# Patient Record
Sex: FEMALE | Race: WHITE | Employment: OTHER | URBAN - METROPOLITAN AREA
[De-identification: names, ages, dates, MRNs, and addresses within clinical notes are randomized per-mention and may not be internally consistent; named-entity substitution may affect disease eponyms.]

---

## 2017-08-12 ENCOUNTER — APPOINTMENT (OUTPATIENT)
Dept: GENERAL RADIOLOGY | Facility: HOSPITAL | Age: 67
End: 2017-08-12
Attending: EMERGENCY MEDICINE
Payer: COMMERCIAL

## 2017-08-12 ENCOUNTER — HOSPITAL ENCOUNTER (EMERGENCY)
Facility: HOSPITAL | Age: 67
Discharge: HOME | End: 2017-08-12
Attending: EMERGENCY MEDICINE
Payer: COMMERCIAL

## 2017-08-12 ENCOUNTER — APPOINTMENT (OUTPATIENT)
Dept: CT IMAGING | Facility: HOSPITAL | Age: 67
End: 2017-08-12
Attending: EMERGENCY MEDICINE
Payer: COMMERCIAL

## 2017-08-12 VITALS
SYSTOLIC BLOOD PRESSURE: 132 MMHG | RESPIRATION RATE: 18 BRPM | HEIGHT: 66 IN | TEMPERATURE: 98 F | OXYGEN SATURATION: 99 % | BODY MASS INDEX: 27.32 KG/M2 | DIASTOLIC BLOOD PRESSURE: 79 MMHG | HEART RATE: 81 BPM | WEIGHT: 170 LBS

## 2017-08-12 DIAGNOSIS — S62.102A LEFT WRIST FRACTURE, CLOSED, INITIAL ENCOUNTER: Primary | ICD-10-CM

## 2017-08-12 DIAGNOSIS — R55 VASOVAGAL NEAR SYNCOPE: ICD-10-CM

## 2017-08-12 DIAGNOSIS — S93.402A SPRAIN OF LEFT ANKLE, UNSPECIFIED LIGAMENT, INITIAL ENCOUNTER: ICD-10-CM

## 2017-08-12 LAB
ANION GAP SERPL CALC-SCNC: 9 MMOL/L (ref 0–18)
BASOPHILS # BLD: 0.1 K/UL (ref 0–0.2)
BASOPHILS NFR BLD: 1 %
BUN SERPL-MCNC: 9 MG/DL (ref 8–20)
BUN/CREAT SERPL: 12.9 (ref 10–20)
CALCIUM SERPL-MCNC: 9.1 MG/DL (ref 8.5–10.5)
CHLORIDE SERPL-SCNC: 98 MMOL/L (ref 95–110)
CO2 SERPL-SCNC: 23 MMOL/L (ref 22–32)
CREAT SERPL-MCNC: 0.7 MG/DL (ref 0.5–1.5)
EOSINOPHIL # BLD: 0.1 K/UL (ref 0–0.7)
EOSINOPHIL NFR BLD: 1 %
ERYTHROCYTE [DISTWIDTH] IN BLOOD BY AUTOMATED COUNT: 13 % (ref 11–15)
GLUCOSE SERPL-MCNC: 118 MG/DL (ref 70–99)
HCT VFR BLD AUTO: 35.9 % (ref 35–48)
HGB BLD-MCNC: 12.3 G/DL (ref 12–16)
LYMPHOCYTES # BLD: 0.7 K/UL (ref 1–4)
LYMPHOCYTES NFR BLD: 11 %
MCH RBC QN AUTO: 32.1 PG (ref 27–32)
MCHC RBC AUTO-ENTMCNC: 34.2 G/DL (ref 32–37)
MCV RBC AUTO: 93.9 FL (ref 80–100)
MONOCYTES # BLD: 0.7 K/UL (ref 0–1)
MONOCYTES NFR BLD: 11 %
NEUTROPHILS # BLD AUTO: 5.1 K/UL (ref 1.8–7.7)
NEUTROPHILS NFR BLD: 76 %
OSMOLALITY UR CALC.SUM OF ELEC: 270 MOSM/KG (ref 275–295)
PLATELET # BLD AUTO: 215 K/UL (ref 140–400)
PMV BLD AUTO: 8.4 FL (ref 7.4–10.3)
POTASSIUM SERPL-SCNC: 4.7 MMOL/L (ref 3.3–5.1)
RBC # BLD AUTO: 3.82 M/UL (ref 3.7–5.4)
SODIUM SERPL-SCNC: 130 MMOL/L (ref 136–144)
WBC # BLD AUTO: 6.7 K/UL (ref 4–11)

## 2017-08-12 PROCEDURE — 99285 EMERGENCY DEPT VISIT HI MDM: CPT

## 2017-08-12 PROCEDURE — 80048 BASIC METABOLIC PNL TOTAL CA: CPT | Performed by: EMERGENCY MEDICINE

## 2017-08-12 PROCEDURE — 36415 COLL VENOUS BLD VENIPUNCTURE: CPT

## 2017-08-12 PROCEDURE — 71010 XR CHEST AP PORTABLE  (CPT=71010): CPT | Performed by: EMERGENCY MEDICINE

## 2017-08-12 PROCEDURE — 70450 CT HEAD/BRAIN W/O DYE: CPT | Performed by: EMERGENCY MEDICINE

## 2017-08-12 PROCEDURE — 93010 ELECTROCARDIOGRAM REPORT: CPT | Performed by: EMERGENCY MEDICINE

## 2017-08-12 PROCEDURE — 85025 COMPLETE CBC W/AUTO DIFF WBC: CPT | Performed by: EMERGENCY MEDICINE

## 2017-08-12 PROCEDURE — 73110 X-RAY EXAM OF WRIST: CPT | Performed by: EMERGENCY MEDICINE

## 2017-08-12 PROCEDURE — 93005 ELECTROCARDIOGRAM TRACING: CPT

## 2017-08-12 PROCEDURE — 73610 X-RAY EXAM OF ANKLE: CPT | Performed by: EMERGENCY MEDICINE

## 2017-08-12 RX ORDER — HYDROCODONE BITARTRATE AND ACETAMINOPHEN 5; 325 MG/1; MG/1
1-2 TABLET ORAL EVERY 4 HOURS PRN
Qty: 20 TABLET | Refills: 0 | Status: SHIPPED | OUTPATIENT
Start: 2017-08-12 | End: 2017-08-19

## 2017-08-12 RX ORDER — SODIUM CHLORIDE 9 MG/ML
1000 INJECTION, SOLUTION INTRAVENOUS ONCE
Status: DISCONTINUED | OUTPATIENT
Start: 2017-08-12 | End: 2017-08-12

## 2017-08-12 RX ORDER — HYDROCODONE BITARTRATE AND ACETAMINOPHEN 5; 325 MG/1; MG/1
2 TABLET ORAL ONCE
Status: COMPLETED | OUTPATIENT
Start: 2017-08-12 | End: 2017-08-12

## 2017-08-12 NOTE — ED PROVIDER NOTES
Patient Seen in: [facility] AND [facility] Emergency Department    History   Patient presents with:  Fall (musculoskeletal, neurologic)    Stated Complaint: fall    HPI    The patient is a 19-year-old female who missed the last 2 steps of her stairs this morning • Cancer Mother    • Diabetes Mother    • Heart Disease Mother        Smoking status: Never Smoker                                                              Smokeless tobacco: Never Used                      Alcohol use: Yes           0.5 oz/week tenderness found. No head of 5th metatarsal and no proximal fibula tenderness found. Neurological: She is alert and oriented to person, place, and time. She has normal strength. No cranial nerve deficit or sensory deficit.  Coordination normal.   Skin: Sk returned and re-examined the patient. The splint was adequately immobilizing the joint and distal to the splint the patient's circulation and sensation was intact.   Ankle Aircast placed to left ankle   MDM     Pulse Ox: 99%, Normal, room air    Cardiac Mo There is also large vessel atherosclerosis. 3. Chronic maxillary sinus disease. 4. Lesser incidental findings as above. Xr Chest Ap Portable  (cpt=71010)    Result Date: 8/12/2017  CONCLUSION: No acute disease.             Radiology exams  Viewed a

## 2017-08-12 NOTE — ED INITIAL ASSESSMENT (HPI)
Pt c/o  left elbow, left wrist, left ankle and head pain s/p trip and  fall today at 0400. No loc. Pt states she missed a step. Pt states she also \"blacked out\" today at 1030 while standing in her kitchen.   Pt states she was out for \"just a few seconds

## 2017-08-16 PROBLEM — S62.015A CLOSED NONDISPLACED FRACTURE OF DISTAL POLE OF SCAPHOID BONE OF LEFT WRIST, INITIAL ENCOUNTER: Status: ACTIVE | Noted: 2017-08-16

## 2018-04-15 ENCOUNTER — APPOINTMENT (OUTPATIENT)
Dept: GENERAL RADIOLOGY | Age: 68
End: 2018-04-15
Attending: EMERGENCY MEDICINE
Payer: COMMERCIAL

## 2018-04-15 ENCOUNTER — HOSPITAL ENCOUNTER (OUTPATIENT)
Age: 68
Discharge: ANOTHER HOSPITAL | End: 2018-04-15
Attending: EMERGENCY MEDICINE
Payer: COMMERCIAL

## 2018-04-15 VITALS
BODY MASS INDEX: 27 KG/M2 | TEMPERATURE: 99 F | OXYGEN SATURATION: 97 % | DIASTOLIC BLOOD PRESSURE: 83 MMHG | HEART RATE: 74 BPM | RESPIRATION RATE: 16 BRPM | WEIGHT: 165 LBS | SYSTOLIC BLOOD PRESSURE: 130 MMHG

## 2018-04-15 DIAGNOSIS — S01.81XA FOREHEAD LACERATION, INITIAL ENCOUNTER: Primary | ICD-10-CM

## 2018-04-15 DIAGNOSIS — S00.81XA FACIAL ABRASION, INITIAL ENCOUNTER: ICD-10-CM
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
## 2018-04-15 DIAGNOSIS — Z79.01 CHRONIC ANTICOAGULATION: ICD-10-CM

## 2018-04-15 DIAGNOSIS — S00.93XA CONTUSION OF HEAD, UNSPECIFIED PART OF HEAD, INITIAL ENCOUNTER: ICD-10-CM

## 2018-04-15 PROCEDURE — 99213 OFFICE O/P EST LOW 20 MIN: CPT

## 2018-04-15 PROCEDURE — 70160 X-RAY EXAM OF NASAL BONES: CPT | Performed by: EMERGENCY MEDICINE

## 2018-04-15 PROCEDURE — 70150 X-RAY EXAM OF FACIAL BONES: CPT | Performed by: EMERGENCY MEDICINE

## 2018-04-15 PROCEDURE — 12013 RPR F/E/E/N/L/M 2.6-5.0 CM: CPT

## 2018-04-15 NOTE — ED PROVIDER NOTES
Patient Seen in: [facility] Immediate Care In [address] [place] [address]    History   Patient presents with:  Fall (musculoskeletal, neurologic)    Stated Complaint: Fall/Head/Nose Injury    HPI  Patient on her way walking into work when she stumbled and fell forwa Current:/83   Pulse 74   Temp 98.5 °F (36.9 °C) (Oral)   Resp 16   Wt 74.8 kg   SpO2 97%   BMI 26.63 kg/m²         Physical Exam   Constitutional: She is oriented to person, place, and time. She appears well-developed and well-nourished. No distress. I discussed with the patient my concern for the viability of the corners of this wound due to the significant trauma to the soft tissue. All questions were answered prior to repair. Verbal consent was obtained from the patient.   The wound was irrigated co Disposition: Ic to ed  4/15/2018  4:54 pm    Follow-up:  No follow-up provider specified.       Medications Prescribed:  Discharge Medication List as of 4/15/2018  4:57 PM

## 2018-04-15 NOTE — ED INITIAL ASSESSMENT (HPI)
Tripped and fell while walking into work today. Landed on her face. Denies LOC. +swelling to upper lip. +laceration to forehead. Pt denies feeling dizzy, lightheaded, no vision change. Takes a daily aspirin (325mg). Pt refused ambulance.

## 2018-04-15 NOTE — ED NOTES
Pt advised to go directly to the ED for a head CT. Pt states that she will go in the morning, but if she experiences any dizziness, vision changes, nausea, vomiting,etc. She will go right away.

## 2018-04-16 ENCOUNTER — APPOINTMENT (OUTPATIENT)
Dept: CT IMAGING | Facility: HOSPITAL | Age: 68
End: 2018-04-16
Payer: COMMERCIAL

## 2018-04-16 ENCOUNTER — HOSPITAL ENCOUNTER (EMERGENCY)
Facility: HOSPITAL | Age: 68
Discharge: HOME | End: 2018-04-16
Payer: COMMERCIAL

## 2018-04-16 VITALS
TEMPERATURE: 98 F | DIASTOLIC BLOOD PRESSURE: 62 MMHG | WEIGHT: 165 LBS | HEART RATE: 76 BPM | OXYGEN SATURATION: 95 % | BODY MASS INDEX: 26.52 KG/M2 | HEIGHT: 66 IN | SYSTOLIC BLOOD PRESSURE: 132 MMHG | RESPIRATION RATE: 18 BRPM

## 2018-04-16 DIAGNOSIS — S01.81XD FOREHEAD LACERATION, SUBSEQUENT ENCOUNTER: ICD-10-CM

## 2018-04-16 DIAGNOSIS — S00.33XD CONTUSION OF NOSE, SUBSEQUENT ENCOUNTER: ICD-10-CM

## 2018-04-16 DIAGNOSIS — S09.90XA MINOR HEAD INJURY, INITIAL ENCOUNTER: Primary | ICD-10-CM

## 2018-04-16 PROCEDURE — 70450 CT HEAD/BRAIN W/O DYE: CPT

## 2018-04-16 PROCEDURE — 99284 EMERGENCY DEPT VISIT MOD MDM: CPT

## 2018-04-16 NOTE — ED PROVIDER NOTES
Patient Seen in: [facility] AND [facility] Emergency Department    History   Patient presents with:  Fall (musculoskeletal, neurologic)    Stated Complaint: fell yesterday    HPI     63-year-old female presents to the emergency department after a trip and fall t Current:/60   Pulse 75   Temp 98 °F (36.7 °C)   Resp 18   Ht 167.6 cm (5' 6\")   Wt 74.8 kg   SpO2 94%   BMI 26.63 kg/m²         Physical Exam   Constitutional: She is oriented to person, place, and time.  She appears well-developed and well-nourished

## 2018-04-16 NOTE — ED NOTES
Showed up at the ct department wanting a CT brain to be done today never went to the ed as directed yesterday. Per CT tech.

## 2018-04-16 NOTE — ED INITIAL ASSESSMENT (HPI)
c/o fall yesterday and injured face/head, takes aspirin daily, pmd sent her for ct scan, denies symptoms, denies loc/nausea

## 2018-04-16 NOTE — ED NOTES
Patient is cleared for discharge per Emergency Department provider. Discharge instructions reviewed with patient including when and how to follow up with healthcare providers and when to seek emergency care.   Patient dressed self and ambulated to exit wit

## 2018-09-24 ENCOUNTER — APPOINTMENT (OUTPATIENT)
Dept: CT IMAGING | Facility: HOSPITAL | Age: 68
DRG: 683 | End: 2018-09-24
Attending: EMERGENCY MEDICINE
Payer: MEDICARE

## 2018-09-24 ENCOUNTER — APPOINTMENT (OUTPATIENT)
Dept: GENERAL RADIOLOGY | Facility: HOSPITAL | Age: 68
DRG: 683 | End: 2018-09-24
Attending: EMERGENCY MEDICINE
Payer: MEDICARE

## 2018-09-24 ENCOUNTER — APPOINTMENT (OUTPATIENT)
Dept: NUCLEAR MEDICINE | Facility: HOSPITAL | Age: 68
DRG: 683 | End: 2018-09-24
Attending: EMERGENCY MEDICINE
Payer: MEDICARE

## 2018-09-24 ENCOUNTER — HOSPITAL ENCOUNTER (INPATIENT)
Facility: HOSPITAL | Age: 68
LOS: 4 days | Discharge: HOME HEALTH | DRG: 683 | End: 2018-09-28
Attending: EMERGENCY MEDICINE | Admitting: HOSPITALIST
Payer: MEDICARE

## 2018-09-24 DIAGNOSIS — N39.0 URINARY TRACT INFECTION WITHOUT HEMATURIA, SITE UNSPECIFIED: Primary | ICD-10-CM

## 2018-09-24 DIAGNOSIS — N17.9 AKI (ACUTE KIDNEY INJURY) (HCC): ICD-10-CM

## 2018-09-24 PROCEDURE — 78582 LUNG VENTILAT&PERFUS IMAGING: CPT | Performed by: EMERGENCY MEDICINE

## 2018-09-24 PROCEDURE — 71045 X-RAY EXAM CHEST 1 VIEW: CPT | Performed by: EMERGENCY MEDICINE

## 2018-09-24 PROCEDURE — 70450 CT HEAD/BRAIN W/O DYE: CPT | Performed by: EMERGENCY MEDICINE

## 2018-09-24 PROCEDURE — 99223 1ST HOSP IP/OBS HIGH 75: CPT | Performed by: HOSPITALIST

## 2018-09-24 PROCEDURE — CB12VZZ PLANAR NUCLEAR MEDICINE IMAGING OF LUNGS AND BRONCHI USING XENON 133 (XE-133): ICD-10-PCS | Performed by: NUCLEAR MEDICINE

## 2018-09-24 RX ORDER — POTASSIUM CHLORIDE 20 MEQ/1
40 TABLET, EXTENDED RELEASE ORAL EVERY 4 HOURS
Status: COMPLETED | OUTPATIENT
Start: 2018-09-24 | End: 2018-09-24

## 2018-09-24 RX ORDER — LORAZEPAM 1 MG/1
1 TABLET ORAL
Status: DISCONTINUED | OUTPATIENT
Start: 2018-09-24 | End: 2018-09-27

## 2018-09-24 RX ORDER — NITROFURANTOIN 25; 75 MG/1; MG/1
100 CAPSULE ORAL 2 TIMES DAILY
Qty: 10 CAPSULE | Refills: 0 | Status: SHIPPED | OUTPATIENT
Start: 2018-09-24 | End: 2018-09-28

## 2018-09-24 RX ORDER — MELATONIN
100 DAILY
Status: DISCONTINUED | OUTPATIENT
Start: 2018-09-25 | End: 2018-09-28

## 2018-09-24 RX ORDER — LORAZEPAM 2 MG/ML
1 INJECTION INTRAMUSCULAR
Status: DISCONTINUED | OUTPATIENT
Start: 2018-09-24 | End: 2018-09-27

## 2018-09-24 RX ORDER — SODIUM CHLORIDE 0.9 % (FLUSH) 0.9 %
3 SYRINGE (ML) INJECTION AS NEEDED
Status: DISCONTINUED | OUTPATIENT
Start: 2018-09-24 | End: 2018-09-28

## 2018-09-24 RX ORDER — PANTOPRAZOLE SODIUM 40 MG/1
40 TABLET, DELAYED RELEASE ORAL
Status: DISCONTINUED | OUTPATIENT
Start: 2018-09-25 | End: 2018-09-28

## 2018-09-24 RX ORDER — FOLIC ACID 1 MG/1
1 TABLET ORAL DAILY
Status: DISCONTINUED | OUTPATIENT
Start: 2018-09-24 | End: 2018-09-28

## 2018-09-24 RX ORDER — LORAZEPAM 2 MG/ML
2 INJECTION INTRAMUSCULAR
Status: DISCONTINUED | OUTPATIENT
Start: 2018-09-24 | End: 2018-09-27

## 2018-09-24 RX ORDER — LEVOTHYROXINE SODIUM 0.03 MG/1
25 TABLET ORAL DAILY
Status: DISCONTINUED | OUTPATIENT
Start: 2018-09-24 | End: 2018-09-28

## 2018-09-24 RX ORDER — ATORVASTATIN CALCIUM 20 MG/1
20 TABLET, FILM COATED ORAL DAILY
Status: DISCONTINUED | OUTPATIENT
Start: 2018-09-24 | End: 2018-09-24

## 2018-09-24 RX ORDER — ONDANSETRON 2 MG/ML
4 INJECTION INTRAMUSCULAR; INTRAVENOUS EVERY 6 HOURS PRN
Status: DISCONTINUED | OUTPATIENT
Start: 2018-09-24 | End: 2018-09-28

## 2018-09-24 RX ORDER — SODIUM CHLORIDE 9 MG/ML
1000 INJECTION, SOLUTION INTRAVENOUS ONCE
Status: COMPLETED | OUTPATIENT
Start: 2018-09-25 | End: 2018-09-25

## 2018-09-24 RX ORDER — LISINOPRIL 20 MG/1
20 TABLET ORAL DAILY
Status: DISCONTINUED | OUTPATIENT
Start: 2018-09-25 | End: 2018-09-25

## 2018-09-24 RX ORDER — SODIUM CHLORIDE 9 MG/ML
INJECTION, SOLUTION INTRAVENOUS
Status: COMPLETED
Start: 2018-09-24 | End: 2018-09-24

## 2018-09-24 RX ORDER — LORAZEPAM 1 MG/1
2 TABLET ORAL
Status: DISCONTINUED | OUTPATIENT
Start: 2018-09-24 | End: 2018-09-27

## 2018-09-24 RX ORDER — MULTIPLE VITAMINS W/ MINERALS TAB 9MG-400MCG
1 TAB ORAL DAILY
Status: DISCONTINUED | OUTPATIENT
Start: 2018-09-24 | End: 2018-09-28

## 2018-09-24 RX ORDER — SODIUM CHLORIDE 9 MG/ML
INJECTION, SOLUTION INTRAVENOUS CONTINUOUS
Status: DISCONTINUED | OUTPATIENT
Start: 2018-09-24 | End: 2018-09-26

## 2018-09-24 NOTE — ED PROVIDER NOTES
[facility] Emergency Department     History   Patient presents with:  Syncope (cardiovascular, neurologic)    Stated Complaint: SYNCOPE/HYPOTENSIVE    HPI    79year old female alcoholic who was brought to the ER after patient had a 1 glass occasionally    Drug use: No      Review of Systems    Positive for stated complaint: SYNCOPE/HYPOTENSIVE  Other systems are as noted in HPI. Constitutional and vital signs reviewed.       All other systems reviewed and negative except as noted abo Skin: Skin is intact. No petechiae noted. She is not diaphoretic. No cyanosis. No pallor. Psychiatric: She has a normal mood and affect. Her behavior is normal.   Nursing note and vitals reviewed.                 ED Course   Nursing notes and Triage vit CREATININE)   REDRAW HFPA/K (P)   MAGNESIUM   VITAMIN B12 WITH REFLEX TO MMA   RAINBOW DRAW BLUE   RAINBOW DRAW LAVENDER   RAINBOW DRAW DARK GREEN   RAINBOW DRAW LIGHT GREEN   RAINBOW DRAW GOLD   RAINBOW DRAW LAVENDER TALL (BNP)   BLOOD CULTURE   BLOOD CUL reports and images reviewed personally and are negative for emergent cause of patient symptoms. I discussed pertinent results, any required  acute management issues, and/or the need for follow-up, with the patient/gaurdian.  See radiology reports for detail List    START taking these medications    Nitrofurantoin Monohyd Macro 100 MG Oral Cap  Take 1 capsule (100 mg total) by mouth 2 (two) times daily for 5 days.   Qty: 10 capsule Refills: 0          Present on Admission  Date Reviewed: 8/23/2017          ICD-

## 2018-09-24 NOTE — H&P
[facility]    PATIENT'S NAME: Sylvia Mariella   ATTENDING PHYSICIAN: Roslyn Roth MD   PATIENT ACCOUNT#:   [id number]    LOCATION:  [place] [address]  MEDICAL RECORD #:   [id number]       YOB: 1950  ADMISSION DATE:       09/24/201 She mainly drinks wine per the ; not clear the exact amount of a daily basis. REVIEW OF SYSTEMS:  According to the , the patient is a very heavy drinker. The patient will not disclose the exact amount of alcohol she drinks.   She has been recommendations to follow.     Dictated By Bolivar Cantrell MD  d: 09/24/2018 13:48:19  t: 09/24/2018 14:19:27  Job [id number]  /

## 2018-09-24 NOTE — ED INITIAL ASSESSMENT (HPI)
FOUND BY FAMILY ON THE TOILET, SYNCOPE, CANNOT REMEMBER EVENTS PRIOR, NO RECENT SICKNESS, (POSSIBLE SICK OVER THE PAST COUPLE DAYS PER DAUGHTER)

## 2018-09-24 NOTE — PLAN OF CARE
Pt daughter tearful, pt daughter states \" I gave her THC and CBD yesterday, so she would eat\"  Daughter reports she did eat a bit more than normal

## 2018-09-25 PROCEDURE — HZ2ZZZZ DETOXIFICATION SERVICES FOR SUBSTANCE ABUSE TREATMENT: ICD-10-PCS | Performed by: HOSPITALIST

## 2018-09-25 PROCEDURE — 99233 SBSQ HOSP IP/OBS HIGH 50: CPT | Performed by: HOSPITALIST

## 2018-09-25 RX ORDER — SODIUM CHLORIDE 9 MG/ML
INJECTION, SOLUTION INTRAVENOUS
Status: COMPLETED
Start: 2018-09-25 | End: 2018-09-26

## 2018-09-25 RX ORDER — HEPARIN SODIUM 5000 [USP'U]/ML
5000 INJECTION, SOLUTION INTRAVENOUS; SUBCUTANEOUS EVERY 12 HOURS
Status: DISCONTINUED | OUTPATIENT
Start: 2018-09-25 | End: 2018-09-28

## 2018-09-25 RX ORDER — 0.9 % SODIUM CHLORIDE 0.9 %
VIAL (ML) INJECTION
Status: COMPLETED
Start: 2018-09-25 | End: 2018-09-25

## 2018-09-25 NOTE — DIETARY NOTE
ADULT NUTRITION INITIAL ASSESSMENT    Pt is at moderate nutrition risk. Pt meets malnutrition criteria.       RECOMMENDATIONS TO MD:  See Nutrition Intervention     CRITERIA FOR MALNUTRITION DIAGNOSIS: Criteria for non-severe malnutrition diagnosis: acute hematuria, site unspecified [N39.0]   PERTINENT PAST MEDICAL HISTORY:  has a past medical history of Cancer ([facility] [address] (2014), Heart disease, High blood pressure, High cholesterol, and Thyroid disease.     ANTHROPOMETRICS:  HT: 170.2 cm (5' 7\")       WT: 68.1 k intake. - Anthropometric Measurement:   Monitor: wt and wt change  - Nutrition Goals: halt wt loss, maintain wt within 5%, PO and supplement greater than 75% of needs, labs WNL, promote healing, support wound healing and ABSTINENCE from alcohol.      Cheyenne Castleman

## 2018-09-25 NOTE — OCCUPATIONAL THERAPY NOTE
OCCUPATIONAL THERAPY EVALUATION - INPATIENT     Room Number: 540/540-A  Evaluation Date: 9/25/2018  Type of Evaluation: Initial       Physician Order: IP Consult to Occupational Therapy  Reason for Therapy: ADL/IADL Dysfunction and Discharge Planning    OC Problems:    Urinary tract infection without hematuria    CHAD (acute kidney injury) [facility])      Past Medical History  Past Medical History:  2014: Cancer ([facility] [address]      Comment:  BCC left parietal scalp  No date: Heart disease  No date: High blood pressure  No limits    COORDINATION  Gross Motor: Poor balance    Fine Motor: WFL    ACTIVITIES OF DAILY LIVING ASSESSMENT  AM-PAC ‘6-Clicks’ Inpatient Daily Activity Short Form  How much help from another person does the patient currently need…  -   Putting on and tremayne week    Millicent Elizalde OTR/L  [facility] AND [facility]

## 2018-09-25 NOTE — WOUND PROGRESS NOTE
WOUND CARE NOTE      PLAN   Recommendations:  Dietary consult for recommendations for nutrition to optimize wound healing  Turn schedules  Heels elevated using pillows, heel wedge or heel boots to offload heels  To prevent sliding: decrease head of bed a

## 2018-09-25 NOTE — PROGRESS NOTES
[facility]    Progress Note    Maximo Ford Patient Status:  Inpatient    1950 MRN [id number]   Location [facility] 5SW/SE Attending Lloyd Jimenez MD   Hosp Day # 1 PCP Hilario Beavers MD       Subjective:     Pt sleeping. MD on 9/24/2018 at 14:04          Nm Lung Vq Vent / Perfusion Scan  (OLJ=[id number])    Result Date: 9/24/2018  CONCLUSION: Ventilation/perfusion lung scan demonstrates no evidence for acute pulmonary embolism.   The accumulation of xenon 133 in the liver is con

## 2018-09-25 NOTE — PHYSICAL THERAPY NOTE
PHYSICAL THERAPY EVALUATION - INPATIENT     Room Number: 540/540-A  Evaluation Date: 9/25/2018  Type of Evaluation: Initial   Physician Order: PT Eval and Treat    Presenting Problem: UTI  Reason for Therapy: Mobility Dysfunction and Discharge Planning PHYSICAL THERAPY MEDICAL/SOCIAL HISTORY     History related to current admission:       Problem List  Principal Problem:    Urinary tract infection without hematuria, site unspecified  Active Problems:    Urinary tract infection without hematuria    AK patient currently have. ..  -   Turning over in bed (including adjusting bedclothes, sheets and blankets)?: A Little   -   Sitting down on and standing up from a chair with arms (e.g., wheelchair, bedside commode, etc.): A Lot   -   Moving from lying on vanessa discharge.    Goal #5   Current Status    Goal #6    Goal #6  Current Status

## 2018-09-26 PROCEDURE — 99233 SBSQ HOSP IP/OBS HIGH 50: CPT | Performed by: HOSPITALIST

## 2018-09-26 NOTE — OCCUPATIONAL THERAPY NOTE
OCCUPATIONAL THERAPY TREATMENT NOTE - INPATIENT        Room Number: 540/540-A                Problem List  Principal Problem:    Urinary tract infection without hematuria, site unspecified  Active Problems:    Urinary tract infection without hematuria    A taking off regular upper body clothing?: None  -   Taking care of personal grooming such as brushing teeth?: None(with set up and pt seated)  -   Eating meals?: None    AM-PAC Score:  Score: 21  Approx Degree of Impairment: 32.79%  Standardized Score (AM-P

## 2018-09-26 NOTE — CM/SW NOTE
SW met w/ pt to discuss d/c planning. Pt lives at home w/ . Pt reported using a walker on occasion. Pt reported no services at this time. SW discussed PT recommendation of SNF - pt declining at this time.  SW discussed HHC - pt agreeable and has no H

## 2018-09-26 NOTE — PLAN OF CARE
Problem: Patient Centered Care  Goal: Patient preferences are identified and integrated in the patient's plan of care  Interventions:  - What would you like us to know as we care for you?   - Provide timely, complete, and accurate information to patient/fa resources and transportation as appropriate  - Identify discharge learning needs (meds, wound care, etc)  - Arrange for interpreters to assist at discharge as needed  - Consider post-discharge preferences of patient/family/discharge partner  - Complete EUGENIA

## 2018-09-26 NOTE — PROGRESS NOTES
[facility]    Progress Note    Maximo Ford Patient Status:  Inpatient    1950 MRN [id number]   Location [facility] [place] 5SW/SE Attending Deni Eng, [address] [facility] Day # 2 PCP Hilario Beavers MD       Subjective:   Maximo Ford Sertraline HCl (ZOLOFT) tab 50 mg 50 mg Oral Daily       Lab Results   Component Value Date    WBC 5.0 09/26/2018    HGB 8.7 (L) 09/26/2018    HCT 26.1 (L) 09/26/2018     09/26/2018    CREATSERUM 1.01 09/26/2018    BUN 10 09/26/2018     09/2 HGB 8.6 (L) 09/25/2018    HGB 10.7 (L) 09/24/2018    Lab Results   Component Value Date     09/26/2018     09/25/2018     09/24/2018       Recent Labs   Lab  09/24/18   1244   09/25/18   0017  09/25/18   0623  09/26/18   0558   GLU  14 of illness, I anticipate that, after discharge, patient will require TBD.

## 2018-09-26 NOTE — RESPIRATORY THERAPY NOTE
FABY ASSESSMENT:    Pt does not have a previous diagnosis of FABY. Pt does not routinely use a CPAP device at home. This pt is suspected to be at high risk for FABY and sleep lab packet was provided to patient for outpatient follow-up.     RECOMENDATIONS:  Con

## 2018-09-27 PROCEDURE — 99233 SBSQ HOSP IP/OBS HIGH 50: CPT | Performed by: HOSPITALIST

## 2018-09-27 RX ORDER — POTASSIUM CHLORIDE 20 MEQ/1
40 TABLET, EXTENDED RELEASE ORAL ONCE
Status: COMPLETED | OUTPATIENT
Start: 2018-09-27 | End: 2018-09-27

## 2018-09-27 RX ORDER — MAGNESIUM OXIDE 400 MG (241.3 MG MAGNESIUM) TABLET
400 TABLET ONCE
Status: COMPLETED | OUTPATIENT
Start: 2018-09-27 | End: 2018-09-27

## 2018-09-27 RX ORDER — LORAZEPAM 0.5 MG/1
0.5 TABLET ORAL EVERY 4 HOURS PRN
Status: DISCONTINUED | OUTPATIENT
Start: 2018-09-27 | End: 2018-09-28

## 2018-09-27 NOTE — HOME CARE LIAISON
Received referral from [address] [place] [address]. [facility]idential unable to accept patient for home health services at this time. Recommended home health agency with psych services, for patient's safety. PITA Willoughby notified.

## 2018-09-27 NOTE — PHYSICAL THERAPY NOTE
PHYSICAL THERAPY TREATMENT NOTE - INPATIENT     Room Number: 540/540-A       Presenting Problem: UTI    Problem List  Principal Problem:    Urinary tract infection without hematuria, site unspecified  Active Problems:    Urinary tract infection without hem Score:  Raw Score: 14   PT Approx Degree of Impairment Score: 61.29%   Standardized Score (AM-PAC Scale): 38.1   CMS Modifier (G-Code): CL    FUNCTIONAL ABILITY STATUS  Gait Assessment   Gait Assistance: Minimum assistance  Distance (ft): 5ft x 1  Assistiv

## 2018-09-27 NOTE — PROGRESS NOTES
[facility]    Progress Note    Caitlin Trotter Patient Status:  Inpatient    1950 MRN [id number]   Location [facility] 5SW/SE Attending Jam Ramirez, [address] [facility] Day # 3 PCP Jennifer Vences MD       Subjective:   aCitlin Trotter tab 25 mcg 25 mcg Oral Daily   metoprolol Tartrate (LOPRESSOR) tab 25 mg 25 mg Oral BID   Sertraline HCl (ZOLOFT) tab 50 mg 50 mg Oral Daily       Lab Results   Component Value Date    WBC 4.5 09/27/2018    HGB 8.1 (L) 09/27/2018    HCT 24.2 (L) 09/27/2018 metabolic acidosis with elevated lactic acid. Improved with IVF. Lactic acid normalized. - off ivfs      CHAD. Due to above. Improving.  - off ivfs   - hold lisinopril     Abnormal liver function tests most likely related to alcoholic liver disease.   -

## 2018-09-27 NOTE — CM/SW NOTE
[facility] unable to accept    Referrals placed to [facility], [facility], and [facility] due to these agencies providing psych RN. HHC order sent.     Nellie Keys, [address]

## 2018-09-28 VITALS
TEMPERATURE: 98 F | HEIGHT: 67 IN | SYSTOLIC BLOOD PRESSURE: 115 MMHG | DIASTOLIC BLOOD PRESSURE: 57 MMHG | BODY MASS INDEX: 23.56 KG/M2 | OXYGEN SATURATION: 100 % | HEART RATE: 76 BPM | RESPIRATION RATE: 20 BRPM | WEIGHT: 150.13 LBS

## 2018-09-28 PROCEDURE — 99239 HOSP IP/OBS DSCHRG MGMT >30: CPT | Performed by: HOSPITALIST

## 2018-09-28 RX ORDER — SODIUM CHLORIDE 9 MG/ML
INJECTION, SOLUTION INTRAVENOUS
Status: COMPLETED
Start: 2018-09-28 | End: 2018-09-28

## 2018-09-28 RX ORDER — ACETAMINOPHEN 325 MG/1
650 TABLET ORAL EVERY 6 HOURS PRN
Status: DISCONTINUED | OUTPATIENT
Start: 2018-09-28 | End: 2018-09-28

## 2018-09-28 RX ORDER — LORAZEPAM 0.5 MG/1
0.5 TABLET ORAL EVERY 4 HOURS PRN
Qty: 5 TABLET | Refills: 0 | Status: ON HOLD | OUTPATIENT
Start: 2018-09-28 | End: 2021-08-19

## 2018-09-28 RX ORDER — MAGNESIUM OXIDE 400 MG (241.3 MG MAGNESIUM) TABLET
800 TABLET ONCE
Status: COMPLETED | OUTPATIENT
Start: 2018-09-28 | End: 2018-09-28

## 2018-09-28 RX ORDER — MELATONIN
100 DAILY
Qty: 30 TABLET | Refills: 0 | Status: SHIPPED | OUTPATIENT
Start: 2018-09-29

## 2018-09-28 RX ORDER — 0.9 % SODIUM CHLORIDE 0.9 %
VIAL (ML) INJECTION
Status: COMPLETED
Start: 2018-09-28 | End: 2018-09-28

## 2018-09-28 NOTE — OCCUPATIONAL THERAPY NOTE
3OCCUPATIONAL THERAPY TREATMENT NOTE - INPATIENT        Room Number: 540/540-A                Problem List  Principal Problem:    Urinary tract infection without hematuria, site unspecified  Active Problems:    Urinary tract infection without hematuria Taking care of personal grooming such as brushing teeth?: None  -   Eating meals?: None    AM-PAC Score:  Score: 21  Approx Degree of Impairment: 32.79%  Standardized Score (AM-PAC Scale): 44.27  CMS Modifier (G-Code): CJ    FUNCTIONAL TRANSFER ASSESSMENT

## 2018-09-28 NOTE — CM/SW NOTE
9/28CM-The Patient's RN informed this Writer that the Patient is medically stable for discharge. This Writer informed the Patient's  of the above.  The Patient's  stated that he toured [email or web address], it was very depressing and would like the [facility]

## 2018-09-28 NOTE — CM/SW NOTE
This CM was contacted by  RN at the request of daughter who would like  written letter daughter will be taking care of mother at home for her employer.  Explained to patients daughter,  She would need to contact patients PCP to ask if the PCP would

## 2018-09-28 NOTE — DISCHARGE SUMMARY
[facility]    Discharge Summary    Junior Hernandez Patient Status:  Inpatient    1950 MRN [id number]   Location [facility] 5SW/SE Attending Jeremy Hammond MD   Hosp Day # 4 PCP Yasmine Hutton MD     Date of Admission:  sitting on the toilet, brought into the emergency department for evaluation, was not postictal.  No reported convulsions. Lactic acid was 4.1. No recorded fever. Potassium is being redrawn. Sodium 130, chloride 93, BUN and creatinine of 26 and 1.48.   G (100 mg total) by mouth daily. Quantity:  30 tablet  Refills:  0        CONTINUE taking these medications      Instructions Prescription details   aspirin 325 MG Tabs      Take 325 mg by mouth daily.    Refills:  0     atorvastatin 20 MG Tabs  Commonly kn

## 2018-09-28 NOTE — CM/SW NOTE
Case Management/ Progression of Care     CM consulted to assist with  transportation home. Patients daughter and daughter's boyfriend present in the patients room.    Daughter states she and her family have no funds to pay for a Medic[employer] ride home, [facility]

## 2018-09-28 NOTE — PROGRESS NOTES
This RN was called to try an IV on the patient. When RN entered the room patient's daughter was taking pictures of patients arms, which had bruising. This RN did explain patient is on a blood thinner, Heparin, and is a side effect of the drug.  When RN atte

## 2020-07-08 ENCOUNTER — APPOINTMENT (OUTPATIENT)
Dept: GENERAL RADIOLOGY | Age: 70
End: 2020-07-08
Attending: EMERGENCY MEDICINE
Payer: MEDICARE

## 2020-07-08 ENCOUNTER — HOSPITAL ENCOUNTER (OUTPATIENT)
Age: 70
Discharge: HOME | End: 2020-07-08
Attending: EMERGENCY MEDICINE
Payer: MEDICARE

## 2020-07-08 VITALS
OXYGEN SATURATION: 96 % | SYSTOLIC BLOOD PRESSURE: 120 MMHG | HEIGHT: 66 IN | DIASTOLIC BLOOD PRESSURE: 64 MMHG | HEART RATE: 72 BPM | RESPIRATION RATE: 16 BRPM | TEMPERATURE: 98 F | WEIGHT: 160 LBS | BODY MASS INDEX: 25.71 KG/M2

## 2020-07-08 DIAGNOSIS — S62.102A CLOSED FRACTURE OF LEFT WRIST, INITIAL ENCOUNTER: ICD-10-CM
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
## 2020-07-08 DIAGNOSIS — M25.539 WRIST PAIN, ACUTE: Primary | ICD-10-CM

## 2020-07-08 PROCEDURE — 73110 X-RAY EXAM OF WRIST: CPT | Performed by: EMERGENCY MEDICINE

## 2020-07-08 PROCEDURE — A4565 SLINGS: HCPCS | Performed by: EMERGENCY MEDICINE

## 2020-07-08 PROCEDURE — 99213 OFFICE O/P EST LOW 20 MIN: CPT | Performed by: EMERGENCY MEDICINE

## 2020-07-08 PROCEDURE — 29075 APPL CST ELBW FNGR SHORT ARM: CPT | Performed by: EMERGENCY MEDICINE

## 2020-07-08 NOTE — ED INITIAL ASSESSMENT (HPI)
MOTRIN GIVEN FOR FEVER Pt states she tripped over the ottoman and fell injuring her left wrist at 2 am today.

## 2020-07-08 NOTE — ED PROVIDER NOTES
Patient Seen in: [facility] AND CLINICS Immediate Care In [address] [place] [address]      History   Patient presents with:  Contusion  Upper Extremity Injury    Stated Complaint: Lt wrist pain; tripped, swollen    HPI  2 AM this morning patient was walking in the dark and trip O2 Device None (Room air)       Current:/64   Pulse 72   Temp 98.4 °F (36.9 °C) (Oral)   Resp 16   Ht 167.6 cm (5' 6\")   Wt 72.6 kg   SpO2 96%   BMI 25.82 kg/m²         Physical Exam  Vitals signs and nursing note reviewed.    Constitutional: discussed above. Long discussion with patient regarding falls in patients over the age of 72 and on aspirin being at increased risk for intracranial bleeding.   Potential outcomes including permanent vegetative state, permanent disability,

## 2020-07-13 ENCOUNTER — OFFICE VISIT (OUTPATIENT)
Dept: ORTHOPEDICS CLINIC | Facility: CLINIC | Age: 70
End: 2020-07-13
Payer: MEDICARE

## 2020-07-13 VITALS — HEIGHT: 66 IN | WEIGHT: 160 LBS | BODY MASS INDEX: 25.71 KG/M2

## 2020-07-13 DIAGNOSIS — S62.102A WRIST FRACTURE, CLOSED, LEFT, INITIAL ENCOUNTER: Primary | ICD-10-CM

## 2020-07-13 PROCEDURE — 99203 OFFICE O/P NEW LOW 30 MIN: CPT | Performed by: ORTHOPAEDIC SURGERY

## 2020-07-13 PROCEDURE — G0463 HOSPITAL OUTPT CLINIC VISIT: HCPCS | Performed by: ORTHOPAEDIC SURGERY

## 2020-07-13 NOTE — PROGRESS NOTES
NURSING INTAKE COMMENTS: Patient presents with:  Wrist Pain: Left wrist fracture 7/8, had XR      HPI: This 78year old female presents today for left wrist pain following an injury last week.   She tripped on an ottoman in her home and fell onto her [employer] Yes        Frequency: 4 or more times a week        Drinks per session: 1 or 2        Comment: per pt, drinks 2-3 glasses of wine per day      Drug use: No      Sexual activity: Not on file       Review of Systems:  GENERAL: feels generally well, no signif the radiocarpal joint. Correlate clinically. Intact ulna. Intact bony carpus. Advanced degenerative narrowing of the left 1st metacarpal-carpal joint, and the 1st and 2nd multangular joints. Moderate bony de ossification noted. SOFT TISSUES: Negative. stated.   Za Luna MD

## 2020-07-29 ENCOUNTER — HOSPITAL ENCOUNTER (OUTPATIENT)
Dept: CT IMAGING | Facility: HOSPITAL | Age: 70
Discharge: HOME | End: 2020-07-29
Attending: PHYSICIAN ASSISTANT
Payer: MEDICARE

## 2020-07-29 DIAGNOSIS — S62.102A WRIST FRACTURE, CLOSED, LEFT, INITIAL ENCOUNTER: ICD-10-CM

## 2020-07-29 PROCEDURE — 73200 CT UPPER EXTREMITY W/O DYE: CPT | Performed by: PHYSICIAN ASSISTANT

## 2021-06-25 ENCOUNTER — HOSPITAL ENCOUNTER (OUTPATIENT)
Age: 71
Discharge: HOME | End: 2021-06-25
Payer: MEDICARE

## 2021-06-25 VITALS
HEART RATE: 76 BPM | SYSTOLIC BLOOD PRESSURE: 108 MMHG | OXYGEN SATURATION: 95 % | DIASTOLIC BLOOD PRESSURE: 55 MMHG | RESPIRATION RATE: 16 BRPM | TEMPERATURE: 98 F

## 2021-06-25 DIAGNOSIS — S00.03XA CONTUSION OF SCALP, INITIAL ENCOUNTER: ICD-10-CM
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
## 2021-06-25 DIAGNOSIS — S51.812A SKIN TEAR OF LEFT FOREARM WITHOUT COMPLICATION, INITIAL ENCOUNTER: ICD-10-CM

## 2021-06-25 PROCEDURE — 99213 OFFICE O/P EST LOW 20 MIN: CPT | Performed by: PHYSICIAN ASSISTANT

## 2021-06-25 NOTE — ED INITIAL ASSESSMENT (HPI)
Pt fell down a flight of 7 stairs in her home last night. Landed on a hardwood floor on L side of body. +contusion and swelling to L side of forehead. Denies LOC. C/o pain to L forearm. +skin tear to L forearm.

## 2021-06-25 NOTE — ED PROVIDER NOTES
Patient Seen in: [facility]      History   Patient presents with:  Fall    Stated Complaint: FALL    HPI/Subjective:   HPI    72 yo female here for evaluation of L forearm skin tear.   Pt tripped walking down the steps and fell down approx 7 s Neurological:      General: No focal deficit present. Mental Status: She is alert and oriented to person, place, and time.    Psychiatric:         Mood and Affect: Mood normal.         Behavior: Behavior normal.              ED Course   Labs Reviewed -

## 2021-08-15 ENCOUNTER — APPOINTMENT (OUTPATIENT)
Dept: GENERAL RADIOLOGY | Facility: HOSPITAL | Age: 71
DRG: 522 | End: 2021-08-15
Attending: EMERGENCY MEDICINE
Payer: MEDICARE

## 2021-08-15 ENCOUNTER — APPOINTMENT (OUTPATIENT)
Dept: CT IMAGING | Facility: HOSPITAL | Age: 71
DRG: 522 | End: 2021-08-15
Attending: EMERGENCY MEDICINE
Payer: MEDICARE

## 2021-08-15 ENCOUNTER — HOSPITAL ENCOUNTER (INPATIENT)
Facility: HOSPITAL | Age: 71
LOS: 4 days | Discharge: NURSING, REHAB OR LONG-TERM CARE | DRG: 522 | End: 2021-08-19
Attending: EMERGENCY MEDICINE | Admitting: HOSPITALIST
Payer: MEDICARE

## 2021-08-15 DIAGNOSIS — S72.91XA FEMUR FRACTURE, RIGHT (HCC): ICD-10-CM

## 2021-08-15 DIAGNOSIS — S72.011A CLOSED SUBCAPITAL FRACTURE OF RIGHT FEMUR, INITIAL ENCOUNTER (HCC): Primary | ICD-10-CM

## 2021-08-15 LAB
ANION GAP SERPL CALC-SCNC: 7 MMOL/L (ref 0–18)
BASOPHILS # BLD AUTO: 0.02 X10(3) UL (ref 0–0.2)
BASOPHILS NFR BLD AUTO: 0.2 %
BILIRUB UR QL: NEGATIVE
BUN BLD-MCNC: 8 MG/DL (ref 7–18)
BUN/CREAT SERPL: 16.3 (ref 10–20)
CALCIUM BLD-MCNC: 9.3 MG/DL (ref 8.5–10.1)
CHLORIDE SERPL-SCNC: 99 MMOL/L (ref 98–112)
CLARITY UR: CLEAR
CO2 SERPL-SCNC: 26 MMOL/L (ref 21–32)
COLOR UR: YELLOW
CREAT BLD-MCNC: 0.49 MG/DL
DEPRECATED RDW RBC AUTO: 47.5 FL (ref 35.1–46.3)
EOSINOPHIL # BLD AUTO: 0.02 X10(3) UL (ref 0–0.7)
EOSINOPHIL NFR BLD AUTO: 0.2 %
ERYTHROCYTE [DISTWIDTH] IN BLOOD BY AUTOMATED COUNT: 13.6 % (ref 11–15)
GLUCOSE BLD-MCNC: 113 MG/DL (ref 70–99)
GLUCOSE UR-MCNC: NEGATIVE MG/DL
HCT VFR BLD AUTO: 35.6 %
HGB BLD-MCNC: 12.1 G/DL
IMM GRANULOCYTES # BLD AUTO: 0.04 X10(3) UL (ref 0–1)
IMM GRANULOCYTES NFR BLD: 0.4 %
LEUKOCYTE ESTERASE UR QL STRIP.AUTO: NEGATIVE
LYMPHOCYTES # BLD AUTO: 0.95 X10(3) UL (ref 1–4)
LYMPHOCYTES NFR BLD AUTO: 8.9 %
MCH RBC QN AUTO: 32.6 PG (ref 26–34)
MCHC RBC AUTO-ENTMCNC: 34 G/DL (ref 31–37)
MCV RBC AUTO: 96 FL
MONOCYTES # BLD AUTO: 0.89 X10(3) UL (ref 0.1–1)
MONOCYTES NFR BLD AUTO: 8.3 %
NEUTROPHILS # BLD AUTO: 8.79 X10 (3) UL (ref 1.5–7.7)
NEUTROPHILS # BLD AUTO: 8.79 X10(3) UL (ref 1.5–7.7)
NEUTROPHILS NFR BLD AUTO: 82 %
NITRITE UR QL STRIP.AUTO: NEGATIVE
OSMOLALITY SERPL CALC.SUM OF ELEC: 273 MOSM/KG (ref 275–295)
PH UR: 8 [PH] (ref 5–8)
PLATELET # BLD AUTO: 200 10(3)UL (ref 150–450)
POTASSIUM SERPL-SCNC: 3.9 MMOL/L (ref 3.5–5.1)
PROT UR-MCNC: NEGATIVE MG/DL
RBC # BLD AUTO: 3.71 X10(6)UL
SARS-COV-2 RNA RESP QL NAA+PROBE: NOT DETECTED
SODIUM SERPL-SCNC: 132 MMOL/L (ref 136–145)
SP GR UR STRIP: 1.01 (ref 1–1.03)
UROBILINOGEN UR STRIP-ACNC: <2
WBC # BLD AUTO: 10.7 X10(3) UL (ref 4–11)

## 2021-08-15 PROCEDURE — 70450 CT HEAD/BRAIN W/O DYE: CPT | Performed by: EMERGENCY MEDICINE

## 2021-08-15 PROCEDURE — 71045 X-RAY EXAM CHEST 1 VIEW: CPT | Performed by: EMERGENCY MEDICINE

## 2021-08-15 PROCEDURE — 73502 X-RAY EXAM HIP UNI 2-3 VIEWS: CPT | Performed by: EMERGENCY MEDICINE

## 2021-08-15 PROCEDURE — 73560 X-RAY EXAM OF KNEE 1 OR 2: CPT | Performed by: EMERGENCY MEDICINE

## 2021-08-15 PROCEDURE — 99222 1ST HOSP IP/OBS MODERATE 55: CPT | Performed by: HOSPITALIST

## 2021-08-15 RX ORDER — LORAZEPAM 1 MG/1
0.5 TABLET ORAL EVERY 4 HOURS PRN
Status: DISCONTINUED | OUTPATIENT
Start: 2021-08-15 | End: 2021-08-19

## 2021-08-15 RX ORDER — MORPHINE SULFATE 4 MG/ML
4 INJECTION, SOLUTION INTRAMUSCULAR; INTRAVENOUS EVERY 2 HOUR PRN
Status: DISCONTINUED | OUTPATIENT
Start: 2021-08-15 | End: 2021-08-19

## 2021-08-15 RX ORDER — FAMOTIDINE 10 MG/ML
20 INJECTION, SOLUTION INTRAVENOUS DAILY
Status: DISCONTINUED | OUTPATIENT
Start: 2021-08-16 | End: 2021-08-17

## 2021-08-15 RX ORDER — DEXTROSE AND SODIUM CHLORIDE 5; .45 G/100ML; G/100ML
INJECTION, SOLUTION INTRAVENOUS CONTINUOUS
Status: DISCONTINUED | OUTPATIENT
Start: 2021-08-15 | End: 2021-08-17

## 2021-08-15 RX ORDER — MORPHINE SULFATE 4 MG/ML
4 INJECTION, SOLUTION INTRAMUSCULAR; INTRAVENOUS ONCE
Status: COMPLETED | OUTPATIENT
Start: 2021-08-15 | End: 2021-08-15

## 2021-08-15 RX ORDER — MORPHINE SULFATE 2 MG/ML
2 INJECTION, SOLUTION INTRAMUSCULAR; INTRAVENOUS EVERY 2 HOUR PRN
Status: DISCONTINUED | OUTPATIENT
Start: 2021-08-15 | End: 2021-08-19

## 2021-08-15 RX ORDER — ATORVASTATIN CALCIUM 20 MG/1
20 TABLET, FILM COATED ORAL DAILY
Status: DISCONTINUED | OUTPATIENT
Start: 2021-08-16 | End: 2021-08-19

## 2021-08-15 RX ORDER — ONDANSETRON 2 MG/ML
4 INJECTION INTRAMUSCULAR; INTRAVENOUS ONCE
Status: COMPLETED | OUTPATIENT
Start: 2021-08-15 | End: 2021-08-15

## 2021-08-15 RX ORDER — HYDRALAZINE HYDROCHLORIDE 20 MG/ML
10 INJECTION INTRAMUSCULAR; INTRAVENOUS EVERY 4 HOURS PRN
Status: DISCONTINUED | OUTPATIENT
Start: 2021-08-15 | End: 2021-08-19

## 2021-08-15 RX ORDER — ONDANSETRON 2 MG/ML
4 INJECTION INTRAMUSCULAR; INTRAVENOUS EVERY 6 HOURS PRN
Status: DISCONTINUED | OUTPATIENT
Start: 2021-08-15 | End: 2021-08-19

## 2021-08-15 RX ORDER — LEVOTHYROXINE SODIUM 0.03 MG/1
25 TABLET ORAL
Status: DISCONTINUED | OUTPATIENT
Start: 2021-08-15 | End: 2021-08-19

## 2021-08-15 NOTE — ED INITIAL ASSESSMENT (HPI)
Pt from home via EMS for mechanical fall with walker at 0300. Pt complaining of right hip pain and is unable to ambulate. Pt reports hitting her head on tile floor and losing consciousness. Pt is on 324 mg aspirin daily.

## 2021-08-16 ENCOUNTER — ANESTHESIA EVENT (OUTPATIENT)
Dept: SURGERY | Facility: HOSPITAL | Age: 71
DRG: 522 | End: 2021-08-16
Payer: MEDICARE

## 2021-08-16 ENCOUNTER — ANESTHESIA (OUTPATIENT)
Dept: SURGERY | Facility: HOSPITAL | Age: 71
DRG: 522 | End: 2021-08-16
Payer: MEDICARE

## 2021-08-16 ENCOUNTER — APPOINTMENT (OUTPATIENT)
Dept: GENERAL RADIOLOGY | Facility: HOSPITAL | Age: 71
DRG: 522 | End: 2021-08-16
Attending: ORTHOPAEDIC SURGERY
Payer: MEDICARE

## 2021-08-16 DIAGNOSIS — S72.91XA FEMUR FRACTURE, RIGHT (HCC): Primary | ICD-10-CM

## 2021-08-16 LAB
ANTIBODY SCREEN: NEGATIVE
MRSA NASAL: NEGATIVE
RH BLOOD TYPE: NEGATIVE
STAPH A BY PCR: POSITIVE

## 2021-08-16 PROCEDURE — 99233 SBSQ HOSP IP/OBS HIGH 50: CPT | Performed by: HOSPITALIST

## 2021-08-16 PROCEDURE — 73501 X-RAY EXAM HIP UNI 1 VIEW: CPT | Performed by: ORTHOPAEDIC SURGERY

## 2021-08-16 PROCEDURE — 0SR90JA REPLACEMENT OF RIGHT HIP JOINT WITH SYNTHETIC SUBSTITUTE, UNCEMENTED, OPEN APPROACH: ICD-10-PCS | Performed by: ORTHOPAEDIC SURGERY

## 2021-08-16 DEVICE — FEMORAL HEAD Ø 36 SIZE M
Type: IMPLANTABLE DEVICE | Site: HIP | Status: FUNCTIONAL
Brand: MECTACER BIOLOX DELTA FEMORAL BALL HEAD

## 2021-08-16 DEVICE — ACETABULAR SHELL Ø52 TWO-HOLE
Type: IMPLANTABLE DEVICE | Site: HIP | Status: FUNCTIONAL
Brand: MPACT 3D METAL

## 2021-08-16 DEVICE — AMISTEM-P STD STEM #3
Type: IMPLANTABLE DEVICE | Site: HIP | Status: FUNCTIONAL
Brand: AMISTEM-P

## 2021-08-16 DEVICE — CANCELLOUS BONE SCREW FLAT HEAD Ø 6,5 L25
Type: IMPLANTABLE DEVICE | Site: HIP | Status: FUNCTIONAL
Brand: MPACT ACETABULAR SYSTEM

## 2021-08-16 RX ORDER — HYDROMORPHONE HYDROCHLORIDE 1 MG/ML
0.6 INJECTION, SOLUTION INTRAMUSCULAR; INTRAVENOUS; SUBCUTANEOUS EVERY 5 MIN PRN
Status: DISCONTINUED | OUTPATIENT
Start: 2021-08-16 | End: 2021-08-16 | Stop reason: HOSPADM

## 2021-08-16 RX ORDER — MORPHINE SULFATE 4 MG/ML
2 INJECTION, SOLUTION INTRAMUSCULAR; INTRAVENOUS EVERY 10 MIN PRN
Status: DISCONTINUED | OUTPATIENT
Start: 2021-08-16 | End: 2021-08-16 | Stop reason: HOSPADM

## 2021-08-16 RX ORDER — LIDOCAINE HYDROCHLORIDE 10 MG/ML
INJECTION, SOLUTION EPIDURAL; INFILTRATION; INTRACAUDAL; PERINEURAL AS NEEDED
Status: DISCONTINUED | OUTPATIENT
Start: 2021-08-16 | End: 2021-08-16 | Stop reason: SURG

## 2021-08-16 RX ORDER — ONDANSETRON 2 MG/ML
4 INJECTION INTRAMUSCULAR; INTRAVENOUS ONCE AS NEEDED
Status: DISCONTINUED | OUTPATIENT
Start: 2021-08-16 | End: 2021-08-16 | Stop reason: HOSPADM

## 2021-08-16 RX ORDER — NALOXONE HYDROCHLORIDE 0.4 MG/ML
80 INJECTION, SOLUTION INTRAMUSCULAR; INTRAVENOUS; SUBCUTANEOUS AS NEEDED
Status: DISCONTINUED | OUTPATIENT
Start: 2021-08-16 | End: 2021-08-16 | Stop reason: HOSPADM

## 2021-08-16 RX ORDER — CEFAZOLIN SODIUM/WATER 2 G/20 ML
2 SYRINGE (ML) INTRAVENOUS EVERY 8 HOURS
Status: COMPLETED | OUTPATIENT
Start: 2021-08-16 | End: 2021-08-17

## 2021-08-16 RX ORDER — HYDROCODONE BITARTRATE AND ACETAMINOPHEN 5; 325 MG/1; MG/1
1 TABLET ORAL AS NEEDED
Status: DISCONTINUED | OUTPATIENT
Start: 2021-08-16 | End: 2021-08-16 | Stop reason: HOSPADM

## 2021-08-16 RX ORDER — HYDROMORPHONE HYDROCHLORIDE 1 MG/ML
0.2 INJECTION, SOLUTION INTRAMUSCULAR; INTRAVENOUS; SUBCUTANEOUS EVERY 5 MIN PRN
Status: DISCONTINUED | OUTPATIENT
Start: 2021-08-16 | End: 2021-08-16 | Stop reason: HOSPADM

## 2021-08-16 RX ORDER — HYDROCODONE BITARTRATE AND ACETAMINOPHEN 5; 325 MG/1; MG/1
2 TABLET ORAL EVERY 4 HOURS PRN
Status: DISCONTINUED | OUTPATIENT
Start: 2021-08-16 | End: 2021-08-19

## 2021-08-16 RX ORDER — SODIUM CHLORIDE, SODIUM LACTATE, POTASSIUM CHLORIDE, CALCIUM CHLORIDE 600; 310; 30; 20 MG/100ML; MG/100ML; MG/100ML; MG/100ML
INJECTION, SOLUTION INTRAVENOUS CONTINUOUS PRN
Status: DISCONTINUED | OUTPATIENT
Start: 2021-08-16 | End: 2021-08-16 | Stop reason: SURG

## 2021-08-16 RX ORDER — DIPHENHYDRAMINE HYDROCHLORIDE 50 MG/ML
25 INJECTION INTRAMUSCULAR; INTRAVENOUS ONCE AS NEEDED
Status: ACTIVE | OUTPATIENT
Start: 2021-08-16 | End: 2021-08-16

## 2021-08-16 RX ORDER — TRANEXAMIC ACID 10 MG/ML
INJECTION, SOLUTION INTRAVENOUS AS NEEDED
Status: DISCONTINUED | OUTPATIENT
Start: 2021-08-16 | End: 2021-08-16 | Stop reason: SURG

## 2021-08-16 RX ORDER — NEOSTIGMINE METHYLSULFATE 1 MG/ML
INJECTION INTRAVENOUS AS NEEDED
Status: DISCONTINUED | OUTPATIENT
Start: 2021-08-16 | End: 2021-08-16 | Stop reason: SURG

## 2021-08-16 RX ORDER — SENNOSIDES 8.6 MG
17.2 TABLET ORAL NIGHTLY
Status: DISCONTINUED | OUTPATIENT
Start: 2021-08-16 | End: 2021-08-19

## 2021-08-16 RX ORDER — PROCHLORPERAZINE EDISYLATE 5 MG/ML
10 INJECTION INTRAMUSCULAR; INTRAVENOUS EVERY 6 HOURS PRN
Status: ACTIVE | OUTPATIENT
Start: 2021-08-16 | End: 2021-08-18

## 2021-08-16 RX ORDER — CEFAZOLIN SODIUM/WATER 2 G/20 ML
2 SYRINGE (ML) INTRAVENOUS
Status: COMPLETED | OUTPATIENT
Start: 2021-08-16 | End: 2021-08-16

## 2021-08-16 RX ORDER — MORPHINE SULFATE 10 MG/ML
6 INJECTION, SOLUTION INTRAMUSCULAR; INTRAVENOUS EVERY 10 MIN PRN
Status: DISCONTINUED | OUTPATIENT
Start: 2021-08-16 | End: 2021-08-16 | Stop reason: HOSPADM

## 2021-08-16 RX ORDER — EPHEDRINE SULFATE 50 MG/ML
INJECTION, SOLUTION INTRAVENOUS AS NEEDED
Status: DISCONTINUED | OUTPATIENT
Start: 2021-08-16 | End: 2021-08-16 | Stop reason: SURG

## 2021-08-16 RX ORDER — MORPHINE SULFATE 4 MG/ML
4 INJECTION, SOLUTION INTRAMUSCULAR; INTRAVENOUS EVERY 10 MIN PRN
Status: DISCONTINUED | OUTPATIENT
Start: 2021-08-16 | End: 2021-08-16 | Stop reason: HOSPADM

## 2021-08-16 RX ORDER — BISACODYL 10 MG
10 SUPPOSITORY, RECTAL RECTAL
Status: DISCONTINUED | OUTPATIENT
Start: 2021-08-16 | End: 2021-08-19

## 2021-08-16 RX ORDER — PHENYLEPHRINE HCL 10 MG/ML
VIAL (ML) INJECTION AS NEEDED
Status: DISCONTINUED | OUTPATIENT
Start: 2021-08-16 | End: 2021-08-16 | Stop reason: SURG

## 2021-08-16 RX ORDER — PROCHLORPERAZINE EDISYLATE 5 MG/ML
5 INJECTION INTRAMUSCULAR; INTRAVENOUS ONCE AS NEEDED
Status: DISCONTINUED | OUTPATIENT
Start: 2021-08-16 | End: 2021-08-16 | Stop reason: HOSPADM

## 2021-08-16 RX ORDER — HYDROMORPHONE HYDROCHLORIDE 1 MG/ML
0.4 INJECTION, SOLUTION INTRAMUSCULAR; INTRAVENOUS; SUBCUTANEOUS EVERY 5 MIN PRN
Status: DISCONTINUED | OUTPATIENT
Start: 2021-08-16 | End: 2021-08-16 | Stop reason: HOSPADM

## 2021-08-16 RX ORDER — HYDROCODONE BITARTRATE AND ACETAMINOPHEN 5; 325 MG/1; MG/1
1 TABLET ORAL EVERY 4 HOURS PRN
Status: DISCONTINUED | OUTPATIENT
Start: 2021-08-16 | End: 2021-08-19

## 2021-08-16 RX ORDER — GLYCOPYRROLATE 0.2 MG/ML
INJECTION, SOLUTION INTRAMUSCULAR; INTRAVENOUS AS NEEDED
Status: DISCONTINUED | OUTPATIENT
Start: 2021-08-16 | End: 2021-08-16 | Stop reason: SURG

## 2021-08-16 RX ORDER — SODIUM PHOSPHATE, DIBASIC AND SODIUM PHOSPHATE, MONOBASIC 7; 19 G/133ML; G/133ML
1 ENEMA RECTAL ONCE AS NEEDED
Status: DISCONTINUED | OUTPATIENT
Start: 2021-08-16 | End: 2021-08-19

## 2021-08-16 RX ORDER — DOCUSATE SODIUM 100 MG/1
100 CAPSULE, LIQUID FILLED ORAL 2 TIMES DAILY
Status: DISCONTINUED | OUTPATIENT
Start: 2021-08-16 | End: 2021-08-19

## 2021-08-16 RX ORDER — ROCURONIUM BROMIDE 10 MG/ML
INJECTION, SOLUTION INTRAVENOUS AS NEEDED
Status: DISCONTINUED | OUTPATIENT
Start: 2021-08-16 | End: 2021-08-16 | Stop reason: SURG

## 2021-08-16 RX ORDER — CEFAZOLIN SODIUM/WATER 2 G/20 ML
2 SYRINGE (ML) INTRAVENOUS EVERY 8 HOURS
Status: DISCONTINUED | OUTPATIENT
Start: 2021-08-16 | End: 2021-08-16

## 2021-08-16 RX ORDER — METOPROLOL TARTRATE 5 MG/5ML
2.5 INJECTION INTRAVENOUS ONCE
Status: DISCONTINUED | OUTPATIENT
Start: 2021-08-16 | End: 2021-08-16 | Stop reason: HOSPADM

## 2021-08-16 RX ORDER — DEXAMETHASONE SODIUM PHOSPHATE 4 MG/ML
VIAL (ML) INJECTION AS NEEDED
Status: DISCONTINUED | OUTPATIENT
Start: 2021-08-16 | End: 2021-08-16 | Stop reason: SURG

## 2021-08-16 RX ORDER — ASPIRIN 325 MG
325 TABLET, DELAYED RELEASE (ENTERIC COATED) ORAL 2 TIMES DAILY
Status: DISCONTINUED | OUTPATIENT
Start: 2021-08-16 | End: 2021-08-19

## 2021-08-16 RX ORDER — ONDANSETRON 2 MG/ML
INJECTION INTRAMUSCULAR; INTRAVENOUS AS NEEDED
Status: DISCONTINUED | OUTPATIENT
Start: 2021-08-16 | End: 2021-08-16 | Stop reason: SURG

## 2021-08-16 RX ORDER — DIPHENHYDRAMINE HYDROCHLORIDE 50 MG/ML
12.5 INJECTION INTRAMUSCULAR; INTRAVENOUS EVERY 4 HOURS PRN
Status: DISCONTINUED | OUTPATIENT
Start: 2021-08-16 | End: 2021-08-19

## 2021-08-16 RX ORDER — SODIUM CHLORIDE, SODIUM LACTATE, POTASSIUM CHLORIDE, CALCIUM CHLORIDE 600; 310; 30; 20 MG/100ML; MG/100ML; MG/100ML; MG/100ML
INJECTION, SOLUTION INTRAVENOUS CONTINUOUS
Status: DISCONTINUED | OUTPATIENT
Start: 2021-08-16 | End: 2021-08-16 | Stop reason: HOSPADM

## 2021-08-16 RX ORDER — POLYETHYLENE GLYCOL 3350 17 G/17G
17 POWDER, FOR SOLUTION ORAL DAILY PRN
Status: DISCONTINUED | OUTPATIENT
Start: 2021-08-16 | End: 2021-08-19

## 2021-08-16 RX ORDER — HYDROCODONE BITARTRATE AND ACETAMINOPHEN 5; 325 MG/1; MG/1
2 TABLET ORAL AS NEEDED
Status: DISCONTINUED | OUTPATIENT
Start: 2021-08-16 | End: 2021-08-16 | Stop reason: HOSPADM

## 2021-08-16 RX ORDER — DIPHENHYDRAMINE HCL 25 MG
25 CAPSULE ORAL EVERY 4 HOURS PRN
Status: DISCONTINUED | OUTPATIENT
Start: 2021-08-16 | End: 2021-08-19

## 2021-08-16 RX ADMIN — EPHEDRINE SULFATE 15 MG: 50 INJECTION, SOLUTION INTRAVENOUS at 15:23:00

## 2021-08-16 RX ADMIN — ROCURONIUM BROMIDE 20 MG: 10 INJECTION, SOLUTION INTRAVENOUS at 14:01:00

## 2021-08-16 RX ADMIN — ROCURONIUM BROMIDE 10 MG: 10 INJECTION, SOLUTION INTRAVENOUS at 13:45:00

## 2021-08-16 RX ADMIN — SODIUM CHLORIDE, SODIUM LACTATE, POTASSIUM CHLORIDE, CALCIUM CHLORIDE: 600; 310; 30; 20 INJECTION, SOLUTION INTRAVENOUS at 13:37:00

## 2021-08-16 RX ADMIN — PHENYLEPHRINE HCL 100 MCG: 10 MG/ML VIAL (ML) INJECTION at 14:11:00

## 2021-08-16 RX ADMIN — EPHEDRINE SULFATE 10 MG: 50 INJECTION, SOLUTION INTRAVENOUS at 14:00:00

## 2021-08-16 RX ADMIN — CEFAZOLIN SODIUM/WATER 2 G: 2 G/20 ML SYRINGE (ML) INTRAVENOUS at 13:57:00

## 2021-08-16 RX ADMIN — EPHEDRINE SULFATE 15 MG: 50 INJECTION, SOLUTION INTRAVENOUS at 14:01:00

## 2021-08-16 RX ADMIN — DEXAMETHASONE SODIUM PHOSPHATE 4 MG: 4 MG/ML VIAL (ML) INJECTION at 13:50:00

## 2021-08-16 RX ADMIN — SODIUM CHLORIDE, SODIUM LACTATE, POTASSIUM CHLORIDE, CALCIUM CHLORIDE: 600; 310; 30; 20 INJECTION, SOLUTION INTRAVENOUS at 15:27:00

## 2021-08-16 RX ADMIN — ONDANSETRON 4 MG: 2 INJECTION INTRAMUSCULAR; INTRAVENOUS at 13:50:00

## 2021-08-16 RX ADMIN — GLYCOPYRROLATE 0.6 MG: 0.2 INJECTION, SOLUTION INTRAMUSCULAR; INTRAVENOUS at 15:13:00

## 2021-08-16 RX ADMIN — NEOSTIGMINE METHYLSULFATE 5 MG: 1 INJECTION INTRAVENOUS at 15:13:00

## 2021-08-16 RX ADMIN — PHENYLEPHRINE HCL 100 MCG: 10 MG/ML VIAL (ML) INJECTION at 15:13:00

## 2021-08-16 RX ADMIN — TRANEXAMIC ACID 1000 MG: 10 INJECTION, SOLUTION INTRAVENOUS at 13:56:00

## 2021-08-16 RX ADMIN — TRANEXAMIC ACID 1000 MG: 10 INJECTION, SOLUTION INTRAVENOUS at 15:14:00

## 2021-08-16 RX ADMIN — LIDOCAINE HYDROCHLORIDE 50 MG: 10 INJECTION, SOLUTION EPIDURAL; INFILTRATION; INTRACAUDAL; PERINEURAL at 13:45:00

## 2021-08-16 NOTE — PROGRESS NOTES
[facility]  Progress Note     Marlene Asp  : 1950    Status: Inpatient  Day #: 1    Attending: Lisa Escobedo MD  PCP: Mike Rivera MD      Assessment and Plan     Right femur neck fracture with minimal displacement  -orthopedic surge Correlation with prior imaging recommended to demonstrate stability.      Dictated by (CST): Sunshine Tracey MD on 8/15/2021 at 7:57 PM     Finalized by (CST): Sunshine Tracey MD on 8/15/2021 at 7:59 PM          [address] [place] [address] ([id number])    Result Date: 8/15/2021

## 2021-08-16 NOTE — PLAN OF CARE
Jaqueline Bender is hospital day #1, SP fall at home- rt femur fracture. Planned surgery later today- consent, blood and anesthesia on chart but not signed- await surgeon to speak with patient.  NPO maintained- had few sips clears at 10 am. Pain managed with Morphine Instruct pt to call for assistance with activity based on assessment  - Modify environment to reduce risk of injury  - Provide assistive devices as appropriate  - Consider OT/PT consult to assist with strengthening/mobility  - Encourage toileting schedule

## 2021-08-16 NOTE — ANESTHESIA PREPROCEDURE EVALUATION
Anesthesia PreOp Note    HPI:     Silvina Barragan is a 73year old female who presents for preoperative consultation requested by: Cory Andrea MD    Date of Surgery: 8/15/2021 - 8/16/2021    Procedure(s):  RIGHT HEMIARTHROPLASTY  Indication: Femur f Calcium (LIPITOR) 20 MG Oral Tab, Take 20 mg by mouth daily. , Disp: , Rfl: , 8/14/2021 at 0900  aspirin 325 MG Oral Tab, Take 325 mg by mouth daily. , Disp: , Rfl: , Past Week at Unknown time  Levothyroxine Sodium (SYNTHROID, LEVOTHROID) 25 MCG Oral Tab, Flavio Henley MD, 4 mg at 08/15/21 2310  [MAR Hold] hydrALAzine HCl (APRESOLINE) injection 10 mg, 10 mg, Intravenous, Q4H PRN, Flavio Henley MD  dextrose 5 %-0.45 % NaCl infusion, , Intravenous, Continuous, Flavio Henley MD, Last Rate: 83 mL/hr at Narrative      Not on file    Social Determinants of Health  Financial Resource Strain:       Difficulty of Paying Living Expenses:   Food Insecurity:       Worried About Running Out of Food in the Last Year:       Ran Out of Food in the Last Year:   Trans Height:            Anesthesia Evaluation     Patient summary reviewed and Nursing notes reviewed    No history of anesthetic complications   Airway   Mallampati: II  Neck ROM: full  Dental - normal exam     Pulmonary - negative ROS and normal exam   Car

## 2021-08-16 NOTE — ANESTHESIA PROCEDURE NOTES
Airway  Date/Time: 8/16/2021 2:03 PM  Urgency: Elective    Airway not difficult    General Information and Staff    Patient location during procedure: OR  Anesthesiologist: Dayron Stout MD  Resident/CRNA: Dilan Carrasco CRNA  Performed: ABDULAZIZ Lepe

## 2021-08-16 NOTE — ANESTHESIA POSTPROCEDURE EVALUATION
Patient: Caitlin Trotter    Procedure Summary     Date: 08/16/21 Room / Location: [address] [place] [address] MAIN OR 06 / [address] [place] [address] MAIN OR    Anesthesia Start: 5233 Anesthesia Stop: 8522    Procedure: RIGHT TOTAL HIP ARTHROPLASTY (Right Hip) Diagnosis:       Femur fracture, right ([facility]

## 2021-08-16 NOTE — ED QUICK NOTES
Orders for admission, patient is aware of plan and ready to go upstairs. Any questions, please call ED RN Bren  at extension [phone].    Type of COVID test sent:rapid  COVID Suspicion level: Low  Titratable drug(s) infusing:none  Rate:    LOC at time of tr

## 2021-08-16 NOTE — H&P
751 Anay Corrigan Patient Status:  Emergency    1950 MRN [id number]   Location [address] Attending Roula Decker MD   [facility] Day # 0 PCP Hussain Dumont MD     Date:  / Medications:  Prior to Admission Medications   Prescriptions Last Dose Informant Patient Reported? Taking? Atorvastatin Calcium (LIPITOR) 20 MG Oral Tab   Yes No   Sig: Take 20 mg by mouth daily.      LORazepam 0.5 MG Oral Tab   No No   Sig: Take 1 tablet edema. Gastrointestinal:  Soft, non-tender, non-distended, normal bowel sounds, no organomegaly. Lymphatics:  No lymphadenopathy neck, axilla, groin. Musculoskeletal: Decreased range of motion right hip secondary to pain  Feet:  Normal pulses.   Neurolog XR HIP W OR WO PELVIS 2 OR 3 VIEWS, RIGHT (CPT=73502)    Result Date: 8/15/2021  CONCLUSION: Acute subcapital fracture of the right hip.     Dictated by (CST): Saji Ferraro MD on 8/15/2021 at 7:55 PM     Finalized by (CST): Saji Ferraro MD on 8/15/

## 2021-08-16 NOTE — ED PROVIDER NOTES
Patient Seen in: [facility] AND [facility] Emergency Department      History   Patient presents with:  Fall    Stated Complaint: HIP PAIN POST MECHANICAL FALL    HPI/Subjective:   HPI    81-year-old female with history of hypertension, hypercholesterolemia, thyr Resp 18   Ht 167.6 cm (5' 6\")   Wt 79.4 kg   SpO2 93%   BMI 28.25 kg/m²         Physical Exam        General Appearance: alert, no distress  Eyes: pupils equal and round no injection  Respiratory: chest is non tender to palpation, breath sounds are equal limits   CBC W/ DIFFERENTIAL - Abnormal; Notable for the following components:    RBC 3.71 (*)     RDW-SD 47.5 (*)     Neutrophil Absolute Prelim 8.79 (*)     Neutrophil Absolute 8.79 (*)     Lymphocyte Absolute 0.95 (*)     All other components within nor subcapital fracture of right femur ([facility] S72.011A 8/15/2021 Unknown

## 2021-08-16 NOTE — PLAN OF CARE
Milli Watson is alert and oriented. Her pain is being managed with ivp morphine as needed. She is currently on bedrest. She is voiding well via the [employer]. The bed is low/locked and all needs are within reach.  Educated on Call/Don't Fall and Bed Alarm precautio assistive devices as appropriate  - Consider OT/PT consult to assist with strengthening/mobility  - Encourage toileting schedule  Outcome: Progressing     Problem: DISCHARGE PLANNING  Goal: Discharge to home or other facility with appropriate resources  Britt

## 2021-08-16 NOTE — OPERATIVE REPORT
[facility] OPERATING ROOM  Operative Note     Jeffrey Coelho Location: OR   [facility] [id number] N [id number]   Admission Date 8/15/2021 Operation Date 8/16/2021   Attending Physician Bartolo Bamberger, MD Operating Physician Aidan Hayes MD      [address] [facility] patient desired to proceed with total hip arthroplasty, and written and verbal consent was given by the   patient for the procedure. OPERATIVE FINDINGS: Right hip displaced femoral neck fracture and moderate osteoarthritic changes.     At the end of the cut based on preoperative   templating, marked this with a Bovie measuring from the lesser   trochanter, and confirmed appropriate neck cut angle with a small   broach placed in the subcutaneous tissue.   I then completed   the neck cut with a sagittal saw Next, I used sequential broach   system to broach up to the appropriate size stem based on   preoperative templating as well as noting excellent fill of   the metaphysis and good stability with internal and external   rotation of the broach handle.     I th good condition. POSTOPERATIVE PLAN:  The patient will be admitted to the   hospital [facility] and be managed per the total hip arthroplasty   protocol with posterior hip precautions.   The patient will be   mobilized with physical therapy and be weightbearing

## 2021-08-17 LAB
ANION GAP SERPL CALC-SCNC: 7 MMOL/L (ref 0–18)
BASOPHILS # BLD AUTO: 0.03 X10(3) UL (ref 0–0.2)
BASOPHILS NFR BLD AUTO: 0.3 %
BUN BLD-MCNC: 9 MG/DL (ref 7–18)
BUN/CREAT SERPL: 13.6 (ref 10–20)
CALCIUM BLD-MCNC: 9 MG/DL (ref 8.5–10.1)
CHLORIDE SERPL-SCNC: 99 MMOL/L (ref 98–112)
CO2 SERPL-SCNC: 25 MMOL/L (ref 21–32)
CREAT BLD-MCNC: 0.66 MG/DL
DEPRECATED RDW RBC AUTO: 50.8 FL (ref 35.1–46.3)
EOSINOPHIL # BLD AUTO: 0.05 X10(3) UL (ref 0–0.7)
EOSINOPHIL NFR BLD AUTO: 0.4 %
ERYTHROCYTE [DISTWIDTH] IN BLOOD BY AUTOMATED COUNT: 13.9 % (ref 11–15)
GLUCOSE BLD-MCNC: 96 MG/DL (ref 70–99)
HCT VFR BLD AUTO: 34.8 %
HGB BLD-MCNC: 11.4 G/DL
IMM GRANULOCYTES # BLD AUTO: 0.05 X10(3) UL (ref 0–1)
IMM GRANULOCYTES NFR BLD: 0.4 %
LYMPHOCYTES # BLD AUTO: 1.61 X10(3) UL (ref 1–4)
LYMPHOCYTES NFR BLD AUTO: 14.5 %
MCH RBC QN AUTO: 32.9 PG (ref 26–34)
MCHC RBC AUTO-ENTMCNC: 32.8 G/DL (ref 31–37)
MCV RBC AUTO: 100.3 FL
MONOCYTES # BLD AUTO: 0.93 X10(3) UL (ref 0.1–1)
MONOCYTES NFR BLD AUTO: 8.3 %
NEUTROPHILS # BLD AUTO: 8.47 X10 (3) UL (ref 1.5–7.7)
NEUTROPHILS # BLD AUTO: 8.47 X10(3) UL (ref 1.5–7.7)
NEUTROPHILS NFR BLD AUTO: 76.1 %
OSMOLALITY SERPL CALC.SUM OF ELEC: 271 MOSM/KG (ref 275–295)
PLATELET # BLD AUTO: 210 10(3)UL (ref 150–450)
POTASSIUM SERPL-SCNC: 3.9 MMOL/L (ref 3.5–5.1)
RBC # BLD AUTO: 3.47 X10(6)UL
SODIUM SERPL-SCNC: 131 MMOL/L (ref 136–145)
WBC # BLD AUTO: 11.1 X10(3) UL (ref 4–11)

## 2021-08-17 PROCEDURE — 99233 SBSQ HOSP IP/OBS HIGH 50: CPT | Performed by: HOSPITALIST

## 2021-08-17 RX ORDER — FAMOTIDINE 20 MG/1
20 TABLET ORAL DAILY
Status: DISCONTINUED | OUTPATIENT
Start: 2021-08-17 | End: 2021-08-19

## 2021-08-17 NOTE — PLAN OF CARE
POD#1. A&O x4, forgetful. Right hip dressing c/d/I. Abduction pillow in place. Denies pain. Ice pack to right hip. Fall precau tions in place. Call light in reach & bed alarm on. Pt will need to go to rehab when discharged.   Problem: Patient Centered Care Consider cultural and social influences on pain and pain management  - Manage/alleviate anxiety  - Utilize distraction and/or relaxation techniques  - Monitor for opioid side effects  - Notify MD/LIP if interventions unsuccessful or patient reports new ian

## 2021-08-17 NOTE — PROGRESS NOTES
[facility]  Progress Note     Reynaldo Ross  : 1950    Status: Inpatient  Day #: 2    Attending: Katie Sanderson MD  PCP: Kary Deleon MD      Assessment and Plan     Right femur neck fracture with minimal displacement  -orthopedic surge within the trachea from a healed patellar fracture. One the wires appears to be fractured with a fragment seen along the posterior knee capsule. Correlation with prior imaging recommended to demonstrate stability.      Dictated by (CST): Keyla Silverman MD o atorvastatin  20 mg Oral Daily   • Levothyroxine Sodium  25 mcg Oral Before breakfast   • metoprolol tartrate  25 mg Oral BID   • sertraline  50 mg Oral Daily   • famoTIDine  20 mg Intravenous Daily      PRN Meds: sodium chloride 0.9%, PEG 3350, magnesium

## 2021-08-17 NOTE — PLAN OF CARE
A&O x4 can be forgetful at times. Right hip dressing c/d/I. Up with PT  & OT today. Pain controlled per MAR. Abduction pillow while in bed. Voiding. Ice pack to right hip. Fall precau tions in place. Call light in reach & bed alarm on.  Pt will need to go limitations  - Instruct pt to call for assistance with activity based on assessment  - Modify environment to reduce risk of injury  - Provide assistive devices as appropriate  - Consider OT/PT consult to assist with strengthening/mobility  - Encourage toil

## 2021-08-17 NOTE — PROGRESS NOTES
[facility]    Progress Note    Nannette Fitzgerald Patient Status:  Inpatient    1950 MRN [id number]   Location [facility] 4W/SW/SE Attending Mi Vora MD   Hosp Day # 2 PCP Corinne Collazo MD        Subjective:   Nannette Fitzgerald is a 08/17/2021    CO2 25.0 08/17/2021    GLU 96 08/17/2021    CA 9.0 08/17/2021    ALB 2.4 (L) 09/25/2018    ALKPHO 55 09/25/2018    BILT 0.4 09/25/2018    TP 5.5 (L) 09/25/2018    AST 42 (H) 09/25/2018    ALT 21 09/25/2018    PTT 28.6 09/24/2018    INR 1.0 09 fracture of the right hip.     Dictated by (CST): Estephanie Alvarez MD on 8/15/2021 at 7:55 PM     Finalized by (CST): Estephanie Alvarez MD on 8/15/2021 at 7:56 PM          EKG    Result Date: 8/15/2021  ECG Report  Interpretation  -------------------------- Sinus Rh

## 2021-08-17 NOTE — PHYSICAL THERAPY NOTE
PHYSICAL THERAPY HIP EVALUATION - INPATIENT     Room Number: 432/432-A  Evaluation Date: 8/17/2021  Type of Evaluation: Initial  Physician Order: PT Eval and Treat    Presenting Problem: s/p R WILLIAM - posterior approach s/p fall with R femur fx  Reason for T LAURIE.    Patient will benefit from continued IP PT services to address these deficits in preparation for discharge. DISCHARGE RECOMMENDATIONS  PT Discharge Recommendations: Sub-acute rehabilitation    PLAN  PT Treatment Plan: Bed mobility; Body mechanics; risk    WEIGHT BEARING RESTRICTION  Weight Bearing Restriction: R lower extremity        R Lower Extremity: Weight Bearing as Tolerated       PAIN ASSESSMENT  Rating:  (Not rated)  Location: R hip  Management Techniques: Activity promotion; Body mechanics;R mechanics  Functional activity tolerated  Gait training  Posture  ROM  Transfer training  posterior hip precautions, safety awareness, role of LAURIE    Patient End of Session: In bed;Needs met;Call light within reach;RN aware of session/findings; All patient

## 2021-08-17 NOTE — OCCUPATIONAL THERAPY NOTE
OCCUPATIONAL THERAPY EVALUATION - INPATIENT      Room Number: 432/432-A  Evaluation Date: 8/17/2021  Type of Evaluation: Initial  Presenting Problem:  (closed fx of R hip)    Physician Order: IP Consult to Occupational Therapy  Reason for Therapy: ADL/IADL impairment may benefit from LAURIE. DISCHARGE RECOMMENDATIONS  OT Discharge Recommendations: Sub-acute rehabilitation  OT Device Recommendations: TBD    PLAN  OT Treatment Plan: Balance activities; Energy conservation/work simplification techniques;ADL patsy RLE  Management Techniques: Relaxation;Repositioning    COGNITION  WFL    RANGE OF MOTION   Upper extremity ROM is within functional limits    STRENGTH ASSESSMENT  Upper extremity strength is within functional limits    ACTIVITIES OF DAILY LIVING ASSESSMEN min a   Comment:     Patient will complete toilet transfer with min a   Comment:     Patient will complete self care task at sink level with cga   Comment:    Patient will independently recall posterior hip precautions  Comment:         Goals  on:

## 2021-08-17 NOTE — PROGRESS NOTES
[facility] Pharmacy Note: Route Optimization for Famotidine (PEPCID)    Patient is currently on Famotidine (PEPCID) 20 mg IV every 24 hours.    The patient meets the criteria to convert to the oral equivalent as established by the IV to Oral conversion protocol ap

## 2021-08-18 LAB
ANION GAP SERPL CALC-SCNC: 5 MMOL/L (ref 0–18)
BASOPHILS # BLD AUTO: 0.02 X10(3) UL (ref 0–0.2)
BASOPHILS NFR BLD AUTO: 0.2 %
BUN BLD-MCNC: 7 MG/DL (ref 7–18)
BUN/CREAT SERPL: 20 (ref 10–20)
CALCIUM BLD-MCNC: 8.5 MG/DL (ref 8.5–10.1)
CHLORIDE SERPL-SCNC: 102 MMOL/L (ref 98–112)
CO2 SERPL-SCNC: 28 MMOL/L (ref 21–32)
CREAT BLD-MCNC: 0.35 MG/DL
DEPRECATED RDW RBC AUTO: 50.4 FL (ref 35.1–46.3)
EOSINOPHIL # BLD AUTO: 0.12 X10(3) UL (ref 0–0.7)
EOSINOPHIL NFR BLD AUTO: 1.2 %
ERYTHROCYTE [DISTWIDTH] IN BLOOD BY AUTOMATED COUNT: 13.8 % (ref 11–15)
GLUCOSE BLD-MCNC: 101 MG/DL (ref 70–99)
HCT VFR BLD AUTO: 33.3 %
HGB BLD-MCNC: 11.1 G/DL
IMM GRANULOCYTES # BLD AUTO: 0.06 X10(3) UL (ref 0–1)
IMM GRANULOCYTES NFR BLD: 0.6 %
LYMPHOCYTES # BLD AUTO: 0.79 X10(3) UL (ref 1–4)
LYMPHOCYTES NFR BLD AUTO: 8.1 %
MCH RBC QN AUTO: 33.1 PG (ref 26–34)
MCHC RBC AUTO-ENTMCNC: 33.3 G/DL (ref 31–37)
MCV RBC AUTO: 99.4 FL
MONOCYTES # BLD AUTO: 0.56 X10(3) UL (ref 0.1–1)
MONOCYTES NFR BLD AUTO: 5.7 %
NEUTROPHILS # BLD AUTO: 8.25 X10 (3) UL (ref 1.5–7.7)
NEUTROPHILS # BLD AUTO: 8.25 X10(3) UL (ref 1.5–7.7)
NEUTROPHILS NFR BLD AUTO: 84.2 %
OSMOLALITY SERPL CALC.SUM OF ELEC: 278 MOSM/KG (ref 275–295)
PLATELET # BLD AUTO: 185 10(3)UL (ref 150–450)
POTASSIUM SERPL-SCNC: 3.8 MMOL/L (ref 3.5–5.1)
RBC # BLD AUTO: 3.35 X10(6)UL
SODIUM SERPL-SCNC: 135 MMOL/L (ref 136–145)
WBC # BLD AUTO: 9.8 X10(3) UL (ref 4–11)

## 2021-08-18 PROCEDURE — 99233 SBSQ HOSP IP/OBS HIGH 50: CPT | Performed by: HOSPITALIST

## 2021-08-18 RX ORDER — ACETAMINOPHEN 325 MG/1
650 TABLET ORAL EVERY 6 HOURS PRN
Status: DISCONTINUED | OUTPATIENT
Start: 2021-08-18 | End: 2021-08-19

## 2021-08-18 NOTE — PLAN OF CARE
POD#2. Medicated with [place] prn. Right hip dressing c/d/I. CMS intact. Abduction pillow in use. Fall precautions in place. Call light in reach & bed alarm on. Discharge planning in progress. Pt will go to [facility] on discharge.   Problem: Patient Centered Care cultural and social influences on pain and pain management  - Manage/alleviate anxiety  - Utilize distraction and/or relaxation techniques  - Monitor for opioid side effects  - Notify MD/LIP if interventions unsuccessful or patient reports new pain  - Anti

## 2021-08-18 NOTE — PHYSICAL THERAPY NOTE
PHYSICAL THERAPY HIP TREATMENT NOTE - INPATIENT    Room Number: 432/432-A            Presenting Problem: s/p R WILLIAM - posterior approach s/p fall with R femur fx    Problem List  Principal Problem:    Closed subcapital fracture of right femur, initial encou conservation;Patient education; Family education;Gait training;Range of motion;Strengthening;Stoop training;Stair training;Transfer training;Balance training    SUBJECTIVE  \"Oh that's right, I forgot you did tell me to sit up for my meals yesterday. \"    O heel/toe raises  reps   Hamstring Curls  reps   Forward, back steps  reps   Short Squats  reps     Patient End of Session: Needs met;Up in chair;Call light within reach;RN aware of session/findings; All patient questions and concerns addressed; Alarm set

## 2021-08-18 NOTE — PROGRESS NOTES
[facility]    Progress Note    Alea Montenegro Patient Status:  Inpatient    1950 MRN [id number]   Location [facility] 4W/SW/SE Attending Lindsey Pickard MD   Hosp Day # 3 PCP Parris Cook MD       Subjective:     Pain contro

## 2021-08-18 NOTE — PLAN OF CARE
Problem: Patient Centered Care  Goal: Patient preferences are identified and integrated in the patient's plan of care  Description: Interventions:  - What would you like us to know as we care for you?  I live with my  at home  - Provide timely, com Manage/alleviate anxiety  - Utilize distraction and/or relaxation techniques  - Monitor for opioid side effects  - Notify MD/LIP if interventions unsuccessful or patient reports new pain  - Anticipate increased pain with activity and pre-medicate as approp continue to monitor.

## 2021-08-19 VITALS
OXYGEN SATURATION: 97 % | WEIGHT: 160.69 LBS | SYSTOLIC BLOOD PRESSURE: 143 MMHG | BODY MASS INDEX: 25.83 KG/M2 | DIASTOLIC BLOOD PRESSURE: 69 MMHG | RESPIRATION RATE: 18 BRPM | HEIGHT: 66 IN | TEMPERATURE: 98 F | HEART RATE: 76 BPM

## 2021-08-19 LAB
ANION GAP SERPL CALC-SCNC: 6 MMOL/L (ref 0–18)
BASOPHILS # BLD AUTO: 0.03 X10(3) UL (ref 0–0.2)
BASOPHILS NFR BLD AUTO: 0.4 %
BUN BLD-MCNC: 6 MG/DL (ref 7–18)
BUN/CREAT SERPL: 19.4 (ref 10–20)
CALCIUM BLD-MCNC: 8.5 MG/DL (ref 8.5–10.1)
CHLORIDE SERPL-SCNC: 104 MMOL/L (ref 98–112)
CO2 SERPL-SCNC: 28 MMOL/L (ref 21–32)
CREAT BLD-MCNC: 0.31 MG/DL
DEPRECATED RDW RBC AUTO: 49.1 FL (ref 35.1–46.3)
EOSINOPHIL # BLD AUTO: 0.17 X10(3) UL (ref 0–0.7)
EOSINOPHIL NFR BLD AUTO: 2 %
ERYTHROCYTE [DISTWIDTH] IN BLOOD BY AUTOMATED COUNT: 13.6 % (ref 11–15)
GLUCOSE BLD-MCNC: 89 MG/DL (ref 70–99)
HCT VFR BLD AUTO: 29.2 %
HGB BLD-MCNC: 9.8 G/DL
IMM GRANULOCYTES # BLD AUTO: 0.04 X10(3) UL (ref 0–1)
IMM GRANULOCYTES NFR BLD: 0.5 %
LYMPHOCYTES # BLD AUTO: 1.63 X10(3) UL (ref 1–4)
LYMPHOCYTES NFR BLD AUTO: 19.4 %
MCH RBC QN AUTO: 33 PG (ref 26–34)
MCHC RBC AUTO-ENTMCNC: 33.6 G/DL (ref 31–37)
MCV RBC AUTO: 98.3 FL
MONOCYTES # BLD AUTO: 0.66 X10(3) UL (ref 0.1–1)
MONOCYTES NFR BLD AUTO: 7.9 %
NEUTROPHILS # BLD AUTO: 5.86 X10 (3) UL (ref 1.5–7.7)
NEUTROPHILS # BLD AUTO: 5.86 X10(3) UL (ref 1.5–7.7)
NEUTROPHILS NFR BLD AUTO: 69.8 %
OSMOLALITY SERPL CALC.SUM OF ELEC: 283 MOSM/KG (ref 275–295)
PLATELET # BLD AUTO: 214 10(3)UL (ref 150–450)
POTASSIUM SERPL-SCNC: 3.6 MMOL/L (ref 3.5–5.1)
RBC # BLD AUTO: 2.97 X10(6)UL
SODIUM SERPL-SCNC: 138 MMOL/L (ref 136–145)
WBC # BLD AUTO: 8.4 X10(3) UL (ref 4–11)

## 2021-08-19 PROCEDURE — 99239 HOSP IP/OBS DSCHRG MGMT >30: CPT | Performed by: HOSPITALIST

## 2021-08-19 RX ORDER — ACETAMINOPHEN 325 MG/1
650 TABLET ORAL EVERY 6 HOURS PRN
Refills: 0 | Status: SHIPPED | COMMUNITY
Start: 2021-08-19

## 2021-08-19 RX ORDER — LORAZEPAM 0.5 MG/1
0.5 TABLET ORAL EVERY 4 HOURS PRN
Qty: 5 TABLET | Refills: 0 | Status: SHIPPED | OUTPATIENT
Start: 2021-08-19

## 2021-08-19 RX ORDER — HYDROCODONE BITARTRATE AND ACETAMINOPHEN 5; 325 MG/1; MG/1
1-2 TABLET ORAL EVERY 4 HOURS PRN
Qty: 40 TABLET | Refills: 0 | Status: SHIPPED | OUTPATIENT
Start: 2021-08-19

## 2021-08-19 RX ORDER — POLYETHYLENE GLYCOL 3350 17 G/17G
17 POWDER, FOR SOLUTION ORAL 2 TIMES DAILY PRN
Refills: 0 | Status: SHIPPED | COMMUNITY
Start: 2021-08-19

## 2021-08-19 NOTE — PROGRESS NOTES
[facility]    Progress Note    Annabella Toribio Patient Status:  Inpatient    1950 MRN [id number]   Location [facility] 4W/SW/SE Attending Geo Laura MD   Hosp Day # 4 PCP Ta Tracy MD       Subjective:     Right hip p

## 2021-08-19 NOTE — PLAN OF CARE
Patient is alert and oriented with confusion at times, easily reoriented. RA. SCDs on for DVT prophylaxis. Incontinent of bladder and bowel, purewick in place. Patient had BM 8/18. PRN Norco given for pain management. Tolerating general diet.  Call light wi Administer analgesics based on type and severity of pain and evaluate response  - Implement non-pharmacological measures as appropriate and evaluate response  - Consider cultural and social influences on pain and pain management  - Manage/alleviate anxiety related to functional status, cognitive ability or social support system  Outcome: Progressing

## 2021-08-19 NOTE — OCCUPATIONAL THERAPY NOTE
OCCUPATIONAL THERAPY TREATMENT NOTE - INPATIENT    Room Number: 432/432-A         Presenting Problem:  (closed fx of R hip)     Problem List  Principal Problem:    Closed subcapital fracture of right femur, initial encounter [facility])  Active Problems:    Clos Extremity: Weight Bearing as Tolerated       PAIN ASSESSMENT  Rating: Unable to rate  Location:  (RLE)  Management Techniques:  Activity promotion;Repositioning     ACTIVITY TOLERANCE                         O2 SATURATIONS       ACTIVITIES OF DAILY LIVING A Comment: mod assist with instruction in [facility] AE    Patient will complete toilet transfer with min a   Comment: pt unable to ambulate distance to toilet , pt ambulating 5 ft with Rw , min assist and chair follow of another for safety .  Poor endurance noted

## 2021-08-19 NOTE — PHYSICAL THERAPY NOTE
PHYSICAL THERAPY HIP TREATMENT NOTE - INPATIENT    Room Number: 432/432-A            Presenting Problem: s/p R WILLIAM - posterior approach s/p fall with R femur fx    Problem List  Principal Problem:    Closed subcapital fracture of right femur, initial encou moises at home. \"    OBJECTIVE  Precautions: WILLIAM - posterior;Limb alert - right;Bed/chair alarm    WEIGHT BEARING RESTRICTION  Weight Bearing Restriction: R lower extremity        R Lower Extremity: Weight Bearing as Tolerated       PAIN ASSESSMENT   Ra Goal #1   Current Status  Mod A    Goal #2 Patient is able to demonstrate transfers Sit to/from Stand at assistance level: contact guard assist      Goal #2  Current Status  Min A with RW   Goal #3 Patient is able to ambulate 150 feet with assistive angelo

## 2021-08-19 NOTE — PLAN OF CARE
Problem: Patient Centered Care  Goal: Patient preferences are identified and integrated in the patient's plan of care  Description: Interventions:  - What would you like us to know as we care for you?  I live with my  at home  - Provide timely, com adequate comfort level or patient's stated pain goal  Description: INTERVENTIONS:  - Encourage pt to monitor pain and request assistance  - Assess pain using appropriate pain scale  - Administer analgesics based on type and severity of pain and evaluate re interpreters to assist at discharge as needed  - Consider post-discharge preferences of patient/family/discharge partner  - Complete POLST form as appropriate  - Assess patient's ability to be responsible for managing their own health  - Refer to [facility]

## 2021-08-19 NOTE — PROGRESS NOTES
[facility]    Progress Note    Caitlin Trotter Patient Status:  Inpatient    1950 MRN [id number]   Location [facility] 4W/SW/SE Attending Socrates [place], [address] [facility] Se Day # 4 PCP Jennifer Vences MD        Subjective:   Caitlin Trotter Component Value Date    WBC 8.4 08/19/2021    HGB 9.8 (L) 08/19/2021    HCT 29.2 (L) 08/19/2021    .0 08/19/2021    CREATSERUM 0.31 (L) 08/19/2021    BUN 6 (L) 08/19/2021     08/19/2021    K 3.6 08/19/2021     08/19/2021    CO2 28.0 08

## 2021-08-20 PROCEDURE — 99306 1ST NF CARE HIGH MDM 50: CPT | Performed by: INTERNAL MEDICINE

## 2021-08-20 NOTE — DISCHARGE SUMMARY
[facility]    Discharge Summary    Silvina Barragan Patient Status:  Inpatient    1950 MRN [id number]   Location [facility] 4W/SW/SE Attending No att. providers found   Hosp Day # 4 PCP Virginia Perez MD     Date of Admission: the day in the bed however when she realized she was not improving decided to come to the ER. She rates her pain as a 7 out of 10 at its worst aggravated by movement no relieving factors pain seems to radiate down the right thigh up to the knee.   X-rays d (four) hours as needed for Anxiety (or insomnia). Quantity: 5 tablet  Refills: 0        CONTINUE taking these medications      Instructions Prescription details   atorvastatin 20 MG Tabs  Commonly known as: LIPITOR      Take 20 mg by mouth daily.    Refil

## 2021-08-23 ENCOUNTER — INITIAL APN SNF VISIT (OUTPATIENT)
Dept: INTERNAL MEDICINE CLINIC | Facility: SKILLED NURSING FACILITY | Age: 71
End: 2021-08-23

## 2021-08-23 DIAGNOSIS — S72.001D CLOSED FRACTURE OF NECK OF RIGHT FEMUR WITH ROUTINE HEALING, SUBSEQUENT ENCOUNTER: ICD-10-CM

## 2021-08-23 DIAGNOSIS — Z96.641 STATUS POST TOTAL REPLACEMENT OF RIGHT HIP: ICD-10-CM

## 2021-08-23 DIAGNOSIS — R53.81 PHYSICAL DECONDITIONING: ICD-10-CM

## 2021-08-23 DIAGNOSIS — E78.5 HYPERLIPIDEMIA, UNSPECIFIED HYPERLIPIDEMIA TYPE: ICD-10-CM

## 2021-08-23 DIAGNOSIS — Z79.899 ENCOUNTER FOR MEDICATION REVIEW: ICD-10-CM

## 2021-08-23 DIAGNOSIS — I15.9 SECONDARY HYPERTENSION: ICD-10-CM

## 2021-08-23 PROCEDURE — 1111F DSCHRG MED/CURRENT MED MERGE: CPT | Performed by: CLINICAL NURSE SPECIALIST

## 2021-08-23 PROCEDURE — 1124F ACP DISCUSS-NO DSCNMKR DOCD: CPT | Performed by: CLINICAL NURSE SPECIALIST

## 2021-08-23 PROCEDURE — 99310 SBSQ NF CARE HIGH MDM 45: CPT | Performed by: CLINICAL NURSE SPECIALIST

## 2021-08-23 NOTE — PROGRESS NOTES
HPI: Queen Zeferino  : 1950  Age 73year old  female patient is admitted to [facility] for LAURIE    Reason for visit: Initial APRN assessment af f/u fall, R femur neck fx, s/p R WILLIAM , HTN, hypothyroidism    EMH: 8/15-  [place] 8/24     SUBJECTIVE/REVIEW OF SYSTEMS:  GENERAL HEALTH:feels well otherwise  HEENT:no visual complaints or deficits  denies nasal congestion, sinus pain or sore throat;  RESPIRATORY: denies shortness of breath, wheezing or cough   CARDIOVASCULAR:denies michael

## 2021-08-24 PROCEDURE — 99308 SBSQ NF CARE LOW MDM 20: CPT | Performed by: INTERNAL MEDICINE

## 2021-08-25 ENCOUNTER — EXTERNAL FACILITY (OUTPATIENT)
Dept: INTERNAL MEDICINE CLINIC | Facility: CLINIC | Age: 71
End: 2021-08-25

## 2021-08-25 DIAGNOSIS — S72.011D CLOSED SUBCAPITAL FRACTURE OF RIGHT FEMUR WITH ROUTINE HEALING, SUBSEQUENT ENCOUNTER: ICD-10-CM

## 2021-08-25 DIAGNOSIS — S72.011A CLOSED SUBCAPITAL FRACTURE OF RIGHT FEMUR, INITIAL ENCOUNTER (HCC): ICD-10-CM

## 2021-08-25 DIAGNOSIS — E78.9 LIPID DISORDER: ICD-10-CM

## 2021-08-25 DIAGNOSIS — I10 ESSENTIAL HYPERTENSION: ICD-10-CM

## 2021-08-25 DIAGNOSIS — S62.015A CLOSED NONDISPLACED FRACTURE OF DISTAL POLE OF SCAPHOID BONE OF LEFT WRIST, INITIAL ENCOUNTER: ICD-10-CM

## 2021-08-26 PROCEDURE — 99308 SBSQ NF CARE LOW MDM 20: CPT | Performed by: INTERNAL MEDICINE

## 2021-08-26 NOTE — PROGRESS NOTES
Pt seen 8/24/21 at  [facility]    Seen in room, no distress noted    Denies any complaints     Continue with current care     SUBJECTIVE  no cp  no sob  no abd pain  mild rt hip pain  vit: stable  obj:  cv s1 s2  chest clear  abd soft  ext Rt hip wound covered

## 2021-08-26 NOTE — PROGRESS NOTES
pt seen 8/20/21  see in room no distress noted  denies any complaints  This is a 72 yo female who fell at home and presented to ED w/ R hip pain. Pt was noted to have femoral neck fx. Ortho, Dr. Tita Singh, was consulted. Pt underwent R WILLIAM on 8/16.  Hospital

## 2021-08-28 ENCOUNTER — TELEPHONE (OUTPATIENT)
Dept: INTERNAL MEDICINE CLINIC | Facility: CLINIC | Age: 71
End: 2021-08-28

## 2021-08-28 NOTE — TELEPHONE ENCOUNTER
Patient requesting doctor to call her. Patient was advised message was sent and waiting on Dr. Carmelo Hadley.

## 2021-08-29 ENCOUNTER — EXTERNAL FACILITY (OUTPATIENT)
Dept: INTERNAL MEDICINE CLINIC | Facility: CLINIC | Age: 71
End: 2021-08-29

## 2021-08-29 DIAGNOSIS — S72.011D CLOSED SUBCAPITAL FRACTURE OF RIGHT FEMUR WITH ROUTINE HEALING, SUBSEQUENT ENCOUNTER: ICD-10-CM

## 2021-08-29 DIAGNOSIS — I10 ESSENTIAL HYPERTENSION: ICD-10-CM

## 2021-08-29 DIAGNOSIS — R26.2 DIFFICULTY WALKING: ICD-10-CM

## 2021-08-29 NOTE — PROGRESS NOTES
Pt seen 8/26/21 at  [facility]  Seen in room, no distress noted  Denies any complaints  Continue with current care  nursing notes an vitals noted  SUBJECTIVE  no cp  no sob  no abd pain  mild rt hip pain  vit: stable  obj:  cv s1 s2  chest clear  abd soft  ext R

## 2021-08-30 ENCOUNTER — SNF VISIT (OUTPATIENT)
Dept: INTERNAL MEDICINE CLINIC | Facility: SKILLED NURSING FACILITY | Age: 71
End: 2021-08-30

## 2021-08-30 DIAGNOSIS — Z96.641 STATUS POST TOTAL REPLACEMENT OF RIGHT HIP: ICD-10-CM

## 2021-08-30 DIAGNOSIS — Z09 FOLLOW UP: ICD-10-CM

## 2021-08-30 DIAGNOSIS — E87.6 HYPOKALEMIA: ICD-10-CM

## 2021-08-30 DIAGNOSIS — N30.00 ACUTE CYSTITIS WITHOUT HEMATURIA: ICD-10-CM

## 2021-08-30 DIAGNOSIS — S72.011D CLOSED SUBCAPITAL FRACTURE OF RIGHT FEMUR WITH ROUTINE HEALING, SUBSEQUENT ENCOUNTER: ICD-10-CM

## 2021-08-30 PROCEDURE — 99308 SBSQ NF CARE LOW MDM 20: CPT | Performed by: CLINICAL NURSE SPECIALIST

## 2021-08-30 NOTE — PROGRESS NOTES
HPI: Elvira Knapp : 1950 Age 73year old female patient is admitted to [facility] for LAURIE     Reason for visit: Gisella Nicole, f/u fall, R femur neck fx, s/p R WILLIAM , HTN, hypothyroidism      EM: 8/15-   [place]:  DIZZINESS   CODE STATUS: Full Code   CURRENT MEDICATIONS: Reviewed on SNF EMR   VITALS: Reviewed   LABS/Imaging: Reviewed.  BMP 9/1, CBC 9/7   SUBJECTIVE/REVIEW OF SYSTEMS:   GENERAL HEALTH:feels well otherwise   HEENT:no visual complaints or deficits denie Hypothyroidism   - Cnt statin   4. HLD   - Cnt levothyroxine   5. Anxiety/depression   - Cnt Zoloft   - Cnt lorazepam PRN   - in-house psychiatry consult    6.  [address]   - likely return home at completion of LAURIE   - PITA assisting w/ discharge planning   [place]

## 2021-08-31 ENCOUNTER — EXTERNAL FACILITY (OUTPATIENT)
Dept: INTERNAL MEDICINE CLINIC | Facility: CLINIC | Age: 71
End: 2021-08-31

## 2021-08-31 DIAGNOSIS — N30.00 ACUTE CYSTITIS WITHOUT HEMATURIA: ICD-10-CM

## 2021-08-31 DIAGNOSIS — I10 ESSENTIAL HYPERTENSION: ICD-10-CM

## 2021-08-31 DIAGNOSIS — R26.2 DIFFICULTY WALKING: ICD-10-CM

## 2021-08-31 DIAGNOSIS — S72.011D CLOSED SUBCAPITAL FRACTURE OF RIGHT FEMUR WITH ROUTINE HEALING, SUBSEQUENT ENCOUNTER: ICD-10-CM

## 2021-08-31 PROCEDURE — 99309 SBSQ NF CARE MODERATE MDM 30: CPT | Performed by: INTERNAL MEDICINE

## 2021-08-31 NOTE — PROGRESS NOTES
Pt seen 8/31/21 at [facility]     Seen in room, no distress noted     Denies any complaints, wants to go home, explained to pt she is not ready yeat needs more therapy and also she has a uti we are treating, pt agrees to stay for more therapy.  will follow     p

## 2021-09-02 PROCEDURE — 99308 SBSQ NF CARE LOW MDM 20: CPT | Performed by: INTERNAL MEDICINE

## 2021-09-06 ENCOUNTER — EXTERNAL FACILITY (OUTPATIENT)
Dept: INTERNAL MEDICINE CLINIC | Facility: CLINIC | Age: 71
End: 2021-09-06

## 2021-09-06 DIAGNOSIS — S72.011A CLOSED SUBCAPITAL FRACTURE OF RIGHT FEMUR, INITIAL ENCOUNTER (HCC): ICD-10-CM

## 2021-09-06 DIAGNOSIS — I10 ESSENTIAL HYPERTENSION: ICD-10-CM

## 2021-09-06 DIAGNOSIS — N30.00 ACUTE CYSTITIS WITHOUT HEMATURIA: ICD-10-CM

## 2021-09-07 NOTE — PROGRESS NOTES
Pt seen 9/2/21 at  [facility]   Seen in room, no distress noted     Denies any complaints,    on ab for uti    psych to se pt     pt to make recommendations for dc home       Continue with current care   nursing notes an vitals noted   SUBJECTIVE   no cp   no so

## 2021-10-19 NOTE — CM/SW NOTE
08/17/21 1500   CM/SW Referral Data   Referral Source Physician   Reason for Referral Discharge planning   Informant Patient   Pertinent Medical Hx   Does patient have an established PCP?  Yes  [address] )   Patient Info   Patient's Current Mental S
PITA followed up on DC planning. PITA printed out SNF list and provided it for the pt to review     Pt will contact [employer] once choice is made    PLAN: SNF - pending choice     Mandeep James, BREONNAW, MSW ext.  [phone]
PITA followed up on DC planning. Pt would like to reserve [facility]. PITA reserved them in aidin asking for a bed     UPDATE: 02:36PM  PITA received confirmation of insurance authorization.  PITA confirmed with RN that pt is medically ready for [facility]
MEDICATIONS  (STANDING):  ALBUTerol    90 MICROgram(s) HFA Inhaler 2 Puff(s) Inhalation every 6 hours  aspirin  chewable 81 milliGRAM(s) Oral daily  atorvastatin 20 milliGRAM(s) Oral at bedtime  clopidogrel Tablet 75 milliGRAM(s) Oral daily  enoxaparin Injectable 40 milliGRAM(s) SubCutaneous every 12 hours  fluticasone furoate/vilanterol 100-25 MICROgram(s) Inhaler 1 Puff(s) Inhalation daily  fluticasone propionate 50 MICROgram(s)/spray Nasal Spray 1 Spray(s) Both Nostrils two times a day  guaifenesin/dextromethorphan Oral Liquid 5 milliLiter(s) Oral every 6 hours  mirtazapine 7.5 milliGRAM(s) Oral at bedtime  OLANZapine Injectable 5 milliGRAM(s) IntraMuscular once  QUEtiapine 25 milliGRAM(s) Oral at bedtime  sodium chloride 0.65% Nasal 1 Spray(s) Both Nostrils two times a day  venlafaxine 37.5 milliGRAM(s) Oral two times a day with meals    MEDICATIONS  (PRN):  acetaminophen   Tablet .. 650 milliGRAM(s) Oral every 6 hours PRN Temp greater or equal to 38C (100.4F), Mild Pain (1 - 3)  melatonin 5 milliGRAM(s) Oral at bedtime PRN Insomnia

## 2022-05-04 ENCOUNTER — APPOINTMENT (OUTPATIENT)
Dept: MRI IMAGING | Facility: HOSPITAL | Age: 72
End: 2022-05-04
Attending: EMERGENCY MEDICINE
Payer: MEDICARE

## 2022-05-04 ENCOUNTER — HOSPITAL ENCOUNTER (OUTPATIENT)
Facility: HOSPITAL | Age: 72
Setting detail: OBSERVATION
LOS: 1 days | Discharge: HOME | End: 2022-05-05
Attending: EMERGENCY MEDICINE | Admitting: HOSPITALIST
Payer: MEDICARE

## 2022-05-04 ENCOUNTER — HOSPITAL ENCOUNTER (OUTPATIENT)
Dept: GENERAL RADIOLOGY | Facility: HOSPITAL | Age: 72
Discharge: HOME | End: 2022-05-04
Attending: INTERNAL MEDICINE
Payer: MEDICARE

## 2022-05-04 DIAGNOSIS — G89.29 OTHER CHRONIC PAIN: ICD-10-CM

## 2022-05-04 DIAGNOSIS — F10.11 HISTORY OF ALCOHOL ABUSE: ICD-10-CM

## 2022-05-04 DIAGNOSIS — N30.00 ACUTE CYSTITIS WITHOUT HEMATURIA: ICD-10-CM

## 2022-05-04 DIAGNOSIS — R41.82 ALTERED MENTAL STATUS, UNSPECIFIED ALTERED MENTAL STATUS TYPE: Primary | ICD-10-CM

## 2022-05-04 DIAGNOSIS — M25.562 LEFT KNEE PAIN: ICD-10-CM

## 2022-05-04 LAB
ALBUMIN SERPL-MCNC: 3.6 G/DL (ref 3.4–5)
ALBUMIN/GLOB SERPL: 0.6 {RATIO} (ref 1–2)
ALP LIVER SERPL-CCNC: 97 U/L
AMPHET UR QL SCN: NEGATIVE
ANION GAP SERPL CALC-SCNC: 4 MMOL/L (ref 0–18)
AST SERPL-CCNC: 25 U/L (ref 15–37)
BARBITURATES UR QL SCN: NEGATIVE
BASE EXCESS BLD CALC-SCNC: 4 MMOL/L (ref ?–2)
BASOPHILS # BLD AUTO: 0.04 X10(3) UL (ref 0–0.2)
BASOPHILS NFR BLD AUTO: 0.7 %
BILIRUB SERPL-MCNC: 0.3 MG/DL (ref 0.1–2)
BILIRUB UR QL: NEGATIVE
BUN BLD-MCNC: 12 MG/DL (ref 7–18)
BUN/CREAT SERPL: 14.8 (ref 10–20)
CALCIUM BLD-MCNC: 9.6 MG/DL (ref 8.5–10.1)
CHLORIDE SERPL-SCNC: 105 MMOL/L (ref 98–112)
CO2 SERPL-SCNC: 26 MMOL/L (ref 21–32)
COCAINE UR QL: NEGATIVE
COLOR UR: YELLOW
CREAT BLD-MCNC: 0.81 MG/DL
CREAT UR-SCNC: 254 MG/DL
DEPRECATED RDW RBC AUTO: 42.4 FL (ref 35.1–46.3)
EOSINOPHIL # BLD AUTO: 0.14 X10(3) UL (ref 0–0.7)
EOSINOPHIL NFR BLD AUTO: 2.5 %
ERYTHROCYTE [DISTWIDTH] IN BLOOD BY AUTOMATED COUNT: 12.8 % (ref 11–15)
ETHANOL SERPL-MCNC: <3 MG/DL (ref ?–3)
GLOBULIN PLAS-MCNC: 5.8 G/DL (ref 2.8–4.4)
GLUCOSE BLD-MCNC: 74 MG/DL (ref 70–99)
GLUCOSE BLDC GLUCOMTR-MCNC: 77 MG/DL (ref 70–99)
GLUCOSE BLDC GLUCOMTR-MCNC: 84 MG/DL (ref 70–99)
GLUCOSE UR-MCNC: NEGATIVE MG/DL
HCO3 BLDV-SCNC: 27.4 MEQ/L (ref 22–26)
HCT VFR BLD AUTO: 42.5 %
HGB BLD-MCNC: 13.9 G/DL
HGB UR QL STRIP.AUTO: NEGATIVE
HYALINE CASTS #/AREA URNS AUTO: PRESENT /LPF
IMM GRANULOCYTES # BLD AUTO: 0.01 X10(3) UL (ref 0–1)
IMM GRANULOCYTES NFR BLD: 0.2 %
KETONES UR-MCNC: NEGATIVE MG/DL
LYMPHOCYTES # BLD AUTO: 1.91 X10(3) UL (ref 1–4)
LYMPHOCYTES NFR BLD AUTO: 33.8 %
MAGNESIUM SERPL-MCNC: 2 MG/DL (ref 1.6–2.6)
MCH RBC QN AUTO: 29.6 PG (ref 26–34)
MCHC RBC AUTO-ENTMCNC: 32.7 G/DL (ref 31–37)
MCV RBC AUTO: 90.6 FL
MDMA UR QL SCN: NEGATIVE
METHADONE UR QL SCN: NEGATIVE
MONOCYTES # BLD AUTO: 0.63 X10(3) UL (ref 0.1–1)
MONOCYTES NFR BLD AUTO: 11.2 %
NEUTROPHILS # BLD AUTO: 2.92 X10 (3) UL (ref 1.5–7.7)
NEUTROPHILS # BLD AUTO: 2.92 X10(3) UL (ref 1.5–7.7)
NEUTROPHILS NFR BLD AUTO: 51.6 %
NITRITE UR QL STRIP.AUTO: NEGATIVE
OPIATES UR QL SCN: NEGATIVE
OSMOLALITY SERPL CALC.SUM OF ELEC: 278 MOSM/KG (ref 275–295)
OXYCODONE UR QL SCN: NEGATIVE
PCO2 BLDV: 53 MM HG (ref 38–50)
PCP UR QL SCN: NEGATIVE
PH BLDV: 7.37 [PH] (ref 7.32–7.43)
PH UR: 5 [PH] (ref 5–8)
PLATELET # BLD AUTO: 152 10(3)UL (ref 150–450)
PO2 BLDV: 40 MM HG (ref 35–40)
POTASSIUM SERPL-SCNC: 4 MMOL/L (ref 3.5–5.1)
PROT SERPL-MCNC: 9.4 G/DL (ref 6.4–8.2)
PROT UR-MCNC: NEGATIVE MG/DL
PUNCTURE CHARGE: NO
RBC # BLD AUTO: 4.69 X10(6)UL
SAO2 % BLDV: 70.3 % (ref 60–85)
SARS-COV-2 RNA RESP QL NAA+PROBE: NOT DETECTED
SODIUM SERPL-SCNC: 135 MMOL/L (ref 136–145)
SP GR UR STRIP: 1.02 (ref 1–1.03)
TSI SER-ACNC: 1.38 MIU/ML (ref 0.36–3.74)
UROBILINOGEN UR STRIP-ACNC: <2
VIT C UR-MCNC: NEGATIVE MG/DL
WBC # BLD AUTO: 5.7 X10(3) UL (ref 4–11)

## 2022-05-04 PROCEDURE — 96367 TX/PROPH/DG ADDL SEQ IV INF: CPT

## 2022-05-04 PROCEDURE — 70551 MRI BRAIN STEM W/O DYE: CPT | Performed by: EMERGENCY MEDICINE

## 2022-05-04 PROCEDURE — 87086 URINE CULTURE/COLONY COUNT: CPT | Performed by: EMERGENCY MEDICINE

## 2022-05-04 PROCEDURE — 84443 ASSAY THYROID STIM HORMONE: CPT | Performed by: EMERGENCY MEDICINE

## 2022-05-04 PROCEDURE — 80307 DRUG TEST PRSMV CHEM ANLYZR: CPT | Performed by: EMERGENCY MEDICINE

## 2022-05-04 PROCEDURE — 96365 THER/PROPH/DIAG IV INF INIT: CPT

## 2022-05-04 PROCEDURE — 93010 ELECTROCARDIOGRAM REPORT: CPT | Performed by: EMERGENCY MEDICINE

## 2022-05-04 PROCEDURE — 73564 X-RAY EXAM KNEE 4 OR MORE: CPT | Performed by: INTERNAL MEDICINE

## 2022-05-04 PROCEDURE — 83735 ASSAY OF MAGNESIUM: CPT | Performed by: EMERGENCY MEDICINE

## 2022-05-04 PROCEDURE — 93005 ELECTROCARDIOGRAM TRACING: CPT

## 2022-05-04 PROCEDURE — 85025 COMPLETE CBC W/AUTO DIFF WBC: CPT | Performed by: EMERGENCY MEDICINE

## 2022-05-04 PROCEDURE — 82077 ASSAY SPEC XCP UR&BREATH IA: CPT | Performed by: EMERGENCY MEDICINE

## 2022-05-04 PROCEDURE — 81001 URINALYSIS AUTO W/SCOPE: CPT | Performed by: EMERGENCY MEDICINE

## 2022-05-04 PROCEDURE — 82805 BLOOD GASES W/O2 SATURATION: CPT | Performed by: EMERGENCY MEDICINE

## 2022-05-04 PROCEDURE — 99285 EMERGENCY DEPT VISIT HI MDM: CPT

## 2022-05-04 PROCEDURE — 96366 THER/PROPH/DIAG IV INF ADDON: CPT

## 2022-05-04 PROCEDURE — 82962 GLUCOSE BLOOD TEST: CPT

## 2022-05-04 PROCEDURE — 80053 COMPREHEN METABOLIC PANEL: CPT | Performed by: EMERGENCY MEDICINE

## 2022-05-04 RX ORDER — LORAZEPAM 0.5 MG/1
0.5 TABLET ORAL EVERY 4 HOURS PRN
Status: DISCONTINUED | OUTPATIENT
Start: 2022-05-04 | End: 2022-05-05

## 2022-05-04 RX ORDER — ONDANSETRON 2 MG/ML
4 INJECTION INTRAMUSCULAR; INTRAVENOUS EVERY 6 HOURS PRN
Status: DISCONTINUED | OUTPATIENT
Start: 2022-05-04 | End: 2022-05-05

## 2022-05-04 RX ORDER — LISINOPRIL 40 MG/1
40 TABLET ORAL DAILY
COMMUNITY

## 2022-05-04 RX ORDER — ASPIRIN 325 MG
325 TABLET, DELAYED RELEASE (ENTERIC COATED) ORAL 2 TIMES DAILY
Status: DISCONTINUED | OUTPATIENT
Start: 2022-05-04 | End: 2022-05-04

## 2022-05-04 RX ORDER — SODIUM CHLORIDE 9 MG/ML
INJECTION, SOLUTION INTRAVENOUS CONTINUOUS
Status: DISCONTINUED | OUTPATIENT
Start: 2022-05-04 | End: 2022-05-05

## 2022-05-04 RX ORDER — POLYETHYLENE GLYCOL 3350 17 G/17G
17 POWDER, FOR SOLUTION ORAL 2 TIMES DAILY PRN
Status: DISCONTINUED | OUTPATIENT
Start: 2022-05-04 | End: 2022-05-05

## 2022-05-04 RX ORDER — ACETAMINOPHEN 325 MG/1
650 TABLET ORAL EVERY 6 HOURS PRN
Status: DISCONTINUED | OUTPATIENT
Start: 2022-05-04 | End: 2022-05-05

## 2022-05-04 RX ORDER — HEPARIN SODIUM 5000 [USP'U]/ML
5000 INJECTION, SOLUTION INTRAVENOUS; SUBCUTANEOUS EVERY 12 HOURS SCHEDULED
Status: DISCONTINUED | OUTPATIENT
Start: 2022-05-04 | End: 2022-05-05

## 2022-05-04 RX ORDER — ACETAMINOPHEN 325 MG/1
650 TABLET ORAL EVERY 6 HOURS PRN
Status: DISCONTINUED | OUTPATIENT
Start: 2022-05-04 | End: 2022-05-04

## 2022-05-04 RX ORDER — MELATONIN
400 DAILY
COMMUNITY

## 2022-05-04 RX ORDER — ASPIRIN 325 MG
325 TABLET, DELAYED RELEASE (ENTERIC COATED) ORAL DAILY
Status: DISCONTINUED | OUTPATIENT
Start: 2022-05-05 | End: 2022-05-05

## 2022-05-04 NOTE — ED QUICK NOTES
Contacted pharmacy to release rocephin  Confirmed with dr Nadya Izquierdo, no cultures needed and antibiotic can be started upon receiving it

## 2022-05-04 NOTE — ED QUICK NOTES
Pt in MRI at this time  Straight cath performed for sterile urine sample  Unable to obtain labs via peripheral stick, Dr Eugene Laws aware, pt taken to MRI and will attempt retrieval of labs upon return   at bedside, reports patient is not very active at home since hip injury  Has been lethargic over the last few days and \"dozing off\" more easily  Reports patient was falling asleep at the breakfast table this afternoon after coming home from outpatient hip xrays  Denies recent fevers, n/v/d or complaints of pain

## 2022-05-04 NOTE — ED INITIAL ASSESSMENT (HPI)
Presents via EMS for altered mental status starting around 1100 today while patient was in the kitchen with her daughter. Pt is lethargic but AAOx4, verbal, responsive. Denies pain. Denies recent illness. Recalls she was getting ready to eat breakfast before arriving.  Pt reports she thinks she just began taking sertaline this morning

## 2022-05-04 NOTE — ED QUICK NOTES
Orders for admission, patient is aware of plan and ready to go upstairs. Any questions, please call ED LIAN soto at extension [phone].      Patient Covid vaccination status: Unvaccinated     COVID Test Ordered in ED: Rapid SARS-CoV-2 by PCR    COVID Suspicion at Admission: N/A    Running Infusions:  None    Mental Status/LOC at time of transport: AAOx4    Other pertinent information:   CIWA score: N/A   NIH score:  N/A

## 2022-05-05 VITALS
BODY MASS INDEX: 28.32 KG/M2 | OXYGEN SATURATION: 98 % | RESPIRATION RATE: 18 BRPM | WEIGHT: 170 LBS | SYSTOLIC BLOOD PRESSURE: 158 MMHG | HEART RATE: 70 BPM | TEMPERATURE: 98 F | DIASTOLIC BLOOD PRESSURE: 69 MMHG | HEIGHT: 65 IN

## 2022-05-05 LAB
ANION GAP SERPL CALC-SCNC: 4 MMOL/L (ref 0–18)
BASOPHILS # BLD AUTO: 0.01 X10(3) UL (ref 0–0.2)
BASOPHILS NFR BLD AUTO: 0.2 %
BUN BLD-MCNC: 11 MG/DL (ref 7–18)
BUN/CREAT SERPL: 19.6 (ref 10–20)
CALCIUM BLD-MCNC: 9 MG/DL (ref 8.5–10.1)
CHLORIDE SERPL-SCNC: 110 MMOL/L (ref 98–112)
CO2 SERPL-SCNC: 24 MMOL/L (ref 21–32)
CREAT BLD-MCNC: 0.56 MG/DL
DEPRECATED RDW RBC AUTO: 44.1 FL (ref 35.1–46.3)
EOSINOPHIL # BLD AUTO: 0.09 X10(3) UL (ref 0–0.7)
EOSINOPHIL NFR BLD AUTO: 1.9 %
ERYTHROCYTE [DISTWIDTH] IN BLOOD BY AUTOMATED COUNT: 12.7 % (ref 11–15)
GLUCOSE BLD-MCNC: 85 MG/DL (ref 70–99)
HCT VFR BLD AUTO: 36.9 %
HGB BLD-MCNC: 11.6 G/DL
IMM GRANULOCYTES # BLD AUTO: 0.02 X10(3) UL (ref 0–1)
IMM GRANULOCYTES NFR BLD: 0.4 %
LYMPHOCYTES # BLD AUTO: 1.44 X10(3) UL (ref 1–4)
LYMPHOCYTES NFR BLD AUTO: 29.7 %
MCH RBC QN AUTO: 29.5 PG (ref 26–34)
MCHC RBC AUTO-ENTMCNC: 31.4 G/DL (ref 31–37)
MCV RBC AUTO: 93.9 FL
MONOCYTES # BLD AUTO: 0.45 X10(3) UL (ref 0.1–1)
MONOCYTES NFR BLD AUTO: 9.3 %
NEUTROPHILS # BLD AUTO: 2.84 X10 (3) UL (ref 1.5–7.7)
NEUTROPHILS # BLD AUTO: 2.84 X10(3) UL (ref 1.5–7.7)
NEUTROPHILS NFR BLD AUTO: 58.5 %
OSMOLALITY SERPL CALC.SUM OF ELEC: 285 MOSM/KG (ref 275–295)
PLATELET # BLD AUTO: 155 10(3)UL (ref 150–450)
POTASSIUM SERPL-SCNC: 3.7 MMOL/L (ref 3.5–5.1)
RBC # BLD AUTO: 3.93 X10(6)UL
SODIUM SERPL-SCNC: 138 MMOL/L (ref 136–145)
WBC # BLD AUTO: 4.9 X10(3) UL (ref 4–11)

## 2022-05-05 PROCEDURE — 97165 OT EVAL LOW COMPLEX 30 MIN: CPT

## 2022-05-05 PROCEDURE — 97530 THERAPEUTIC ACTIVITIES: CPT

## 2022-05-05 PROCEDURE — 97162 PT EVAL MOD COMPLEX 30 MIN: CPT

## 2022-05-05 PROCEDURE — 97535 SELF CARE MNGMENT TRAINING: CPT

## 2022-05-05 PROCEDURE — 85025 COMPLETE CBC W/AUTO DIFF WBC: CPT | Performed by: HOSPITALIST

## 2022-05-05 PROCEDURE — 80048 BASIC METABOLIC PNL TOTAL CA: CPT | Performed by: HOSPITALIST

## 2022-05-05 RX ORDER — LISINOPRIL 40 MG/1
40 TABLET ORAL DAILY
Status: DISCONTINUED | OUTPATIENT
Start: 2022-05-05 | End: 2022-05-05

## 2022-05-05 RX ORDER — LEVOTHYROXINE SODIUM 0.03 MG/1
25 TABLET ORAL DAILY
Status: DISCONTINUED | OUTPATIENT
Start: 2022-05-05 | End: 2022-05-05

## 2022-05-05 RX ORDER — LORAZEPAM 0.5 MG/1
0.5 TABLET ORAL DAILY
Qty: 5 TABLET | Refills: 0 | Status: SHIPPED | COMMUNITY
Start: 2022-05-05

## 2022-05-05 RX ORDER — ATORVASTATIN CALCIUM 20 MG/1
20 TABLET, FILM COATED ORAL DAILY
Status: DISCONTINUED | OUTPATIENT
Start: 2022-05-05 | End: 2022-05-05

## 2022-05-05 RX ORDER — MELATONIN
100 DAILY
Status: DISCONTINUED | OUTPATIENT
Start: 2022-05-05 | End: 2022-05-05

## 2022-05-05 RX ORDER — MELATONIN
400 DAILY
Status: DISCONTINUED | OUTPATIENT
Start: 2022-05-05 | End: 2022-05-05

## 2022-05-05 NOTE — PLAN OF CARE
Problem: Patient Centered Care  Goal: Patient preferences are identified and integrated in the patient's plan of care  Description: Interventions:  - What would you like us to know as we care for you?   - Provide timely, complete, and accurate information to patient/family  - Incorporate patient and family knowledge, values, beliefs, and cultural backgrounds into the planning and delivery of care  - Encourage patient/family to participate in care and decision-making at the level they choose  - Honor patient and family perspectives and choices  Outcome: Progressing     Problem: Patient/Family Goals  Goal: Patient/Family Long Term Goal  Description: Patient's Long Term Goal: to return home    Interventions:  -IV antibiotics  -PT/OT  - See additional Care Plan goals for specific interventions  Outcome: Progressing  Goal: Patient/Family Short Term Goal  Description: Patient's Short Term Goal: to feel better    Interventions:   - follow MD recommendations  - See additional Care Plan goals for specific interventions  Outcome: Progressing     Problem: PAIN - ADULT  Goal: Verbalizes/displays adequate comfort level or patient's stated pain goal  Description: INTERVENTIONS:  - Encourage pt to monitor pain and request assistance  - Assess pain using appropriate pain scale  - Administer analgesics based on type and severity of pain and evaluate response  - Implement non-pharmacological measures as appropriate and evaluate response  - Consider cultural and social influences on pain and pain management  - Manage/alleviate anxiety  - Utilize distraction and/or relaxation techniques  - Monitor for opioid side effects  - Notify MD/LIP if interventions unsuccessful or patient reports new pain  - Anticipate increased pain with activity and pre-medicate as appropriate  Outcome: Progressing     Problem: RISK FOR INFECTION - ADULT  Goal: Absence of fever/infection during anticipated neutropenic period  Description: INTERVENTIONS  - Monitor WBC  - Administer growth factors as ordered  - Implement neutropenic guidelines  Outcome: Progressing     Problem: SAFETY ADULT - FALL  Goal: Free from fall injury  Description: INTERVENTIONS:  - Assess pt frequently for physical needs  - Identify cognitive and physical deficits and behaviors that affect risk of falls.   - [place] fall precautions as indicated by assessment.  - Educate pt/family on patient safety including physical limitations  - Instruct pt to call for assistance with activity based on assessment  - Modify environment to reduce risk of injury  - Provide assistive devices as appropriate  - Consider OT/PT consult to assist with strengthening/mobility  - Encourage toileting schedule  Outcome: Progressing     Problem: DISCHARGE PLANNING  Goal: Discharge to home or other facility with appropriate resources  Description: INTERVENTIONS:  - Identify barriers to discharge w/pt and caregiver  - Include patient/family/discharge partner in discharge planning  - Arrange for needed discharge resources and transportation as appropriate  - Identify discharge learning needs (meds, wound care, etc)  - Arrange for interpreters to assist at discharge as needed  - Consider post-discharge preferences of patient/family/discharge partner  - Complete POLST form as appropriate  - Assess patient's ability to be responsible for managing their own health  - Refer to Case Management Department for coordinating discharge planning if the patient needs post-hospital services based on physician/LIP order or complex needs related to functional status, cognitive ability or social support system  Outcome: Progressing

## 2022-05-05 NOTE — H&P
[facility]    PATIENT'S NAME: Rob Heard   ATTENDING PHYSICIAN: Rossy Oneil MD   PATIENT ACCOUNT#:   [de-identified]    LOCATION:  [place] [address]  MEDICAL RECORD #:   [id number]       YOB: 1950  ADMISSION DATE:       2022    HISTORY AND PHYSICAL EXAMINATION    CHIEF COMPLAINT:  Encephalopathy, urinary tract infection. HISTORY OF PRESENT ILLNESS:  Patient is a 63-year-old  female who was brought in today to the emergency department for evaluation after her family noted that she is confused. CBC and chemistry were unremarkable. Patient is not able to provide significant history, but her urinalysis today showed possible urinary tract infection. She was started on IV Rocephin and also had a drug screen which showed cannabinoids and benzodiazepine. Patient is not able to tell me any other details about these substances. MRI scan of the brain was negative for acute cerebrovascular accident. EKG showed normal sinus rhythm. Patient was not tachycardic. Her potassium and liver function tests were unremarkable. PAST MEDICAL HISTORY:  Patient has history of alcoholism, but she denies recent heavy drinking. She has generalized osteoarthritis and osteoporosis. Cardiac angiogram in the past was negative and she had low normal ejection fraction at 45%. History of hypothyroidism and hypertension. PAST SURGICAL HISTORY:  Right total hip arthroplasty after a fall and femur neck fracture, thyroidectomy, knee surgery, shoulder surgery, and appendectomy. MEDICATIONS:  Please see medication reconciliation list.    ALLERGIES:  Side effects to codeine. No known drug allergies. FAMILY HISTORY:  Mother had heart disease. Father  in an accident. SOCIAL HISTORY:  No tobacco or drug use. She drinks alcohol, per history, heavily at home but she denies recent heavy drinking. Usually independent for basic activities of daily living.     REVIEW OF SYSTEMS:  The patient said her last alcoholic drink was sometime last week. Recently, she has been noticing increased urinary frequency. Family noted that she is slightly more confused today and seemed obtunded. No recorded fever or chills. Other 12-point review of systems negative or unobtainable from the patient. PHYSICAL EXAMINATION:  GENERAL:  Alert, oriented. She could not tell me what is the date today, but able to say the month and the year. Knows that she is at [facility].  She is slightly drowsy. VITAL SIGNS:  Temperature 97.5, pulse 61, respiratory rate 16, blood pressure 136/70, pulse ox 97% on room air. HEENT:  Atraumatic. Oropharynx clear. Dry mucous membranes. Normal hard and soft palate. Eyes:  Anicteric sclerae. NECK:  Supple. No lymphadenopathy. Trachea midline. Full range of motion. LUNGS:  Clear to auscultation bilaterally. Normal respiratory effort. HEART:  Regular rate and rhythm. S1 and S2 auscultated. No murmur. ABDOMEN:  Soft, nondistended. No tenderness. Positive bowel sounds. EXTREMITIES:  No peripheral edema, clubbing, or cyanosis. NEUROLOGIC:  No focal defects. ASSESSMENT AND PLAN:  1. Acute encephalopathy of unclear etiology. It could be multifactorial secondary to cannabinoids and benzodiazepines as her drug screen possible contributing factor. 2.   Urinary tract infection. Patient will be admitted to general medical floor. Fall precautions. IV Rocephin. Monitor her clinical status. Possible underlying mild degree of dementia. Further recommendations to follow.     Dictated By Jonah Barr MD  d: 05/04/2022 18:01:33  t: 05/04/2022 20:43:56  [facility] [id number]  /

## 2022-05-05 NOTE — CM/SW NOTE
Patient failed inpatient criteria. Second level of review completed and supports observation. UR committee in agreement. Discussed with Dr. Guanako Whitfeild who approves observation status. MOON  notice explained and  provided  to the patient . Copy placed in chart  Order for observation in place.     5/5/22 [address] [place] [address], Utilization Review   Ext [phone]

## 2022-05-05 NOTE — CM/SW NOTE
05/05/22 1400   CM/SW Referral Data   Referral Source Social Work (self-referral)   Reason for Referral Discharge planning   Pertinent Medical Hx   Does patient have an established PCP? Yes   Patient Info   Patient's Current Mental Status at Time of Assessment Alert;Oriented   Patient's [address]   Number of Levels in Home 3   Number of Stair in Home   (7 up/7 down, split level)   Patient lives with Spouse/Significant other;Daughter;Grandchild  (, daughter, son-in-law and grandchildren (age 3, 1))   Patient Status Prior to Admission   Independent with ADLs and Mobility Yes   Discharge Needs   Anticipated D/C needs Home health care   Choice of Post-Acute Provider   Informed patient of right to choose their preferred provider Yes   List of appropriate post-acute services provided to patient/family with quality data Yes   Information given to Patient   Patient declines recommended services Yes  (Prefers to go home w/o services)     SW met with pt to discuss PT recommendation for [facility] services. SW provided choice list to pt and pt declined HH at this time. Pt prefers to go home and follow up with PCP for outpatient PT. SW also explored if pt is interested in substance use resources due to hx of alcohol use noted on admission; pt declined. Pt lives at home with  and her daughter's family. Pt stated she is independent with adl's, has a walker and shower bench available, but rarely uses. Ambulates independently on the stairs with hand railings. SW to continue following and evaluate discharge needs.      Shyla Win, [address] [place] [address]

## 2022-05-05 NOTE — ED QUICK NOTES
Pt laying in bed with  at bedside. Banana bag infusing. Denies any needs. Updated pt and  on floor room assignment. Will continue to monitor. --Floor called for update on room cleaning.

## 2022-05-05 NOTE — CM/SW NOTE
PITA updated [facility] referral with pt notes, completed F2F and sent to Stella LEMA to continue following and evaluate discharge needs.      Lissette Walker, [facility]

## 2022-05-05 NOTE — PLAN OF CARE
PT admitted to floor from ED. Oriented to room and call light. Admission navigator completed and  updated on care by relief nurse Marie Glass. IV fluids started. All safety measures in place.  Call light is within reach  Problem: Patient Centered Care  Goal: Patient preferences are identified and integrated in the patient's plan of care  Description: Interventions:  - What would you like us to know as we care for you?   - Provide timely, complete, and accurate information to patient/family  - Incorporate patient and family knowledge, values, beliefs, and cultural backgrounds into the planning and delivery of care  - Encourage patient/family to participate in care and decision-making at the level they choose  - Honor patient and family perspectives and choices  Outcome: Progressing     Problem: Patient/Family Goals  Goal: Patient/Family Long Term Goal  Description: Patient's Long Term Goal:     Interventions:  -   - See additional Care Plan goals for specific interventions  Outcome: Progressing  Goal: Patient/Family Short Term Goal  Description: Patient's Short Term Goal:     Interventions:   -   - See additional Care Plan goals for specific interventions  Outcome: Progressing     Problem: PAIN - ADULT  Goal: Verbalizes/displays adequate comfort level or patient's stated pain goal  Description: INTERVENTIONS:  - Encourage pt to monitor pain and request assistance  - Assess pain using appropriate pain scale  - Administer analgesics based on type and severity of pain and evaluate response  - Implement non-pharmacological measures as appropriate and evaluate response  - Consider cultural and social influences on pain and pain management  - Manage/alleviate anxiety  - Utilize distraction and/or relaxation techniques  - Monitor for opioid side effects  - Notify MD/LIP if interventions unsuccessful or patient reports new pain  - Anticipate increased pain with activity and pre-medicate as appropriate  Outcome: Progressing Problem: RISK FOR INFECTION - ADULT  Goal: Absence of fever/infection during anticipated neutropenic period  Description: INTERVENTIONS  - Monitor WBC  - Administer growth factors as ordered  - Implement neutropenic guidelines  Outcome: Progressing     Problem: SAFETY ADULT - FALL  Goal: Free from fall injury  Description: INTERVENTIONS:  - Assess pt frequently for physical needs  - Identify cognitive and physical deficits and behaviors that affect risk of falls.   - [place] fall precautions as indicated by assessment.  - Educate pt/family on patient safety including physical limitations  - Instruct pt to call for assistance with activity based on assessment  - Modify environment to reduce risk of injury  - Provide assistive devices as appropriate  - Consider OT/PT consult to assist with strengthening/mobility  - Encourage toileting schedule  Outcome: Progressing     Problem: DISCHARGE PLANNING  Goal: Discharge to home or other facility with appropriate resources  Description: INTERVENTIONS:  - Identify barriers to discharge w/pt and caregiver  - Include patient/family/discharge partner in discharge planning  - Arrange for needed discharge resources and transportation as appropriate  - Identify discharge learning needs (meds, wound care, etc)  - Arrange for interpreters to assist at discharge as needed  - Consider post-discharge preferences of patient/family/discharge partner  - Complete POLST form as appropriate  - Assess patient's ability to be responsible for managing their own health  - Refer to Case Management Department for coordinating discharge planning if the patient needs post-hospital services based on physician/LIP order or complex needs related to functional status, cognitive ability or social support system  Outcome: Progressing

## 2022-05-05 NOTE — CM/SW NOTE
Department  notified of request for priyanka Blakely referrals started. Assigned CM/SW to follow up with pt/family on further discharge planning.      Partha Appl  [facility]

## 2022-05-05 NOTE — PROGRESS NOTES
Pt discharged,  provided transportation home. Discharge paperwork reviewed, all questions and concerns addressed. IV access and tele discontinued.

## 2022-05-09 ENCOUNTER — OFFICE VISIT (OUTPATIENT)
Dept: ORTHOPEDICS CLINIC | Facility: CLINIC | Age: 72
End: 2022-05-09
Payer: MEDICARE

## 2022-05-09 VITALS — SYSTOLIC BLOOD PRESSURE: 148 MMHG | HEART RATE: 76 BPM | DIASTOLIC BLOOD PRESSURE: 71 MMHG

## 2022-05-09 DIAGNOSIS — M17.12 PRIMARY OSTEOARTHRITIS OF LEFT KNEE: Primary | ICD-10-CM

## 2022-05-09 PROCEDURE — 99213 OFFICE O/P EST LOW 20 MIN: CPT | Performed by: ORTHOPAEDIC SURGERY

## 2022-05-09 PROCEDURE — 20610 DRAIN/INJ JOINT/BURSA W/O US: CPT | Performed by: ORTHOPAEDIC SURGERY

## 2022-05-09 PROCEDURE — 1111F DSCHRG MED/CURRENT MED MERGE: CPT | Performed by: ORTHOPAEDIC SURGERY

## 2022-05-09 RX ORDER — TRIAMCINOLONE ACETONIDE 40 MG/ML
40 INJECTION, SUSPENSION INTRA-ARTICULAR; INTRAMUSCULAR ONCE
Status: COMPLETED | OUTPATIENT
Start: 2022-05-09 | End: 2022-05-09

## 2022-05-09 RX ADMIN — TRIAMCINOLONE ACETONIDE 40 MG: 40 INJECTION, SUSPENSION INTRA-ARTICULAR; INTRAMUSCULAR at 11:29:00

## 2022-05-09 NOTE — PROGRESS NOTES
Per verbal order from Dr. Alec Garg, draw up 4ml of 1% lidocaine and 1ml of Kenalog 40 for cortisone injection to left knee. Susan Martinez    Patient provided education handout for cortisone injection.

## 2023-05-06 NOTE — BH PROGRESS NOTE
[facility] received consultation for alcohol resources in addition to outpatient therapy/psychiatry providers and confirmed with MD/RN that it was resources only, stating that Dickerson Tj would be discharging shortly. [facility] provided outpatient therapy, psychiatry, and AA/[facility] listings in network with Medicare and within 10 miles of Kiki's home. MD/RN reported no safety concerns, PHQ-4 = 0, CSSR = 0, INDICATING LOW RISK    BHCCM will sign off.     Jessica MINA, LPC
day(s)

## (undated) DEVICE — SUTURE ETHIBOND 2 V-37

## (undated) DEVICE — GAUZE SPONGES,12 PLY: Brand: CURITY

## (undated) DEVICE — HEWSON SUTURE RETRIEVER: Brand: HEWSON SUTURE RETRIEVER

## (undated) DEVICE — VIOLET BRAIDED (POLYGLACTIN 910), SYNTHETIC ABSORBABLE SUTURE: Brand: COATED VICRYL

## (undated) DEVICE — 2T11 #2 PDO 36 X 36: Brand: 2T11 #2 PDO 36 X 36

## (undated) DEVICE — SOL  .9 1000ML BTL

## (undated) DEVICE — DRESSING AQUACEL AG SP 3.5X10

## (undated) DEVICE — Device: Brand: STABLECUT®

## (undated) DEVICE — Device

## (undated) DEVICE — 3M™ IOBAN™ 2 ANTIMICROBIAL INCISE DRAPE 6651EZ: Brand: IOBAN™ 2

## (undated) DEVICE — PILLOW FX 56X38X15CM MED HIP

## (undated) DEVICE — PACK CDS TOTAL HIP

## (undated) DEVICE — CHLORAPREP 26ML APPLICATOR

## (undated) DEVICE — SUTURE VICRYL 0 CP-1

## (undated) DEVICE — SPINOCAN® 18 GA. X 3-1/2 IN. (90 MM) SPINAL NEEDLE: Brand: SPINOCAN®

## (undated) DEVICE — TOTAL HIP W/CER HEAD: Type: IMPLANTABLE DEVICE

## (undated) DEVICE — SOL  .9 3000ML

## (undated) DEVICE — PEN: MARKING STD PT 100/CS: Brand: MEDICAL ACTION INDUSTRIES

## (undated) DEVICE — 2T9 -0- UNDYED MONODERM 36X36: Brand: 2T9 -0- UNDYED MONODERM 36X36

## (undated) DEVICE — SUTURE VICRYL 2-0 FS-1

## (undated) DEVICE — HOOD: Brand: FLYTE

## (undated) DEVICE — SHEET,DRAPE,70X100,STERILE: Brand: MEDLINE

## (undated) DEVICE — SYRINGE MNJCT 35ML LF STRL LL

## (undated) DEVICE — DRAPE SHEET LG

## (undated) DEVICE — 2DE14 2-0 PDO 24 X 24: Brand: 2DE14 2-0 PDO 24 X 24

## (undated) DEVICE — UNDYED BRAIDED (POLYGLACTIN 910), SYNTHETIC ABSORBABLE SUTURE: Brand: COATED VICRYL

## (undated) NOTE — IP AVS SNAPSHOT
Patient Demographics     Address  [address] [address] Phone  [phone] [facility]) *Preferred*  [phone] (Work)  [phone] [facility])      Emergency Contact(s)     Name [address] [place] [address] [phone] Jacek Raines MD In 2 weeks. Specialty: SURGERY, ORTHOPEDIC  Contact information:  [address] [facility] [address] [phone]             Schedule an appointment as soon as possible for a visit with Parker Bartlett MD.    Specialty Saurav Edmonds.  Stan Pizarro MD               Where to Get Your Medications      Please  your prescriptions at the location directed by your doctor or nurse    Bring a paper prescription for each of these medications  HYDROcodone-acetaminophen 5-325 MG Tabs Most Recent Value   Patient Weight  72.9 kg (160 lb 11.2 oz)         Lab Results Last 24 Hours      Basic Metabolic Panel (8) [[id number]] (Abnormal)  Resulted: 08/19/21 0733, Result status: Final result   Ordering provider: Riri Aguila MD  08/18/21 Abbreviation Name Director Address Valid Date Range    162 - [place] [facility]) [facility] ([place] includes the [facility]) Christ[facility]. Babar Mejia M.D. [facility]. [address]  [facility] [address] 03/19/20 1442 - Present            Microbiology Results (All)     P a 7 out of 10 at its worst aggravated by movement no relieving factors pain seems to radiate down the right thigh up to the knee. X-rays done negative evidence of right femur neck fracture.   She denies any recent episodes of chest pain palpitations shortn Weakness, Fatigue. Eye:  Negative. Ear/Nose/Mouth/Throat:  Negative. Respiratory:  Negative  Cardiovascular: Negative  Gastrointestinal:  Negative. Genitourinary:  Negative  Endocrine:  Negative. Immunologic:  Negative.   Musculoskeletal:[SA.1] Right h KNEE (1 OR 2 VIEWS), RIGHT (CPT=73560)    Result Date: 8/15/2021  CONCLUSION:   Mild right knee tricompartmental osteoarthritis without acute fracture or dislocation. Cerclage wires within the trachea from a healed patellar fracture.   One the wires appear Synthroid    Prophylaxis  SCDs, heparin on hold till patient evaluated by orthopedics.     CODE STATUS  Full    Primary care physician  Zeb Castillo MD    Disposition  Clinical course will dictate outcome      Cindy German MD  8/15/2021  8:45 PM[SA.1] issues. She does not use a walker at home.     Past Medical History  Past Medical History:   Diagnosis Date   • Cancer ([facility] [address] 2014    BCC left parietal scalp   • Heart disease    • High blood pressure    • High cholesterol    • Thyroid disease        Past S Continuous  [MAR Hold] famoTIDine (PEPCID) injection 20 mg, 20 mg, Intravenous, Daily      Sertraline HCl (ZOLOFT) 25 MG Oral Tab, Take 50 mg by mouth daily. Atorvastatin Calcium (LIPITOR) 20 MG Oral Tab, Take 20 mg by mouth daily.     aspirin 325 MG Ora TP 5.5 (L) 09/25/2018    AST 42 (H) 09/25/2018    ALT 21 09/25/2018    PTT 28.6 09/24/2018    INR 1.0 09/24/2018    PTP 13.0 09/24/2018    DDIMER 1.23 (H) 09/24/2018    MG 1.4 (L) 09/28/2018    TROP 0.03 09/24/2018    CK 45 09/24/2018    B12 425 09/27/20 discussed the risks, benefits and alternatives to both right hip hemiarthroplasty and total hip arthroplasty.   Patient elected to proceed with total hip arthroplasty which I think is a good option even relatively young age and her pre-existing right hip os get up to chair for all meals and discussed getting up to rolling chair for toileting with RN/PCT. Pt requires Mod A for bed mobility, sit <> stand and bed > chair transfer.  C/o anxiety/fear of falling with mobility and declines further ambulation despite Fair -  Static Standing: Poor  Dynamic Standing: Poor -  ACTIVITY TOLERANCE                         O2 WALK       AM-PAC '6-Clicks' INPATIENT SHORT FORM - BASIC MOBILITY  How much difficulty does the patient currently have. ..  -   Turning over in bed (incl feet with assistive device at assistance level: modified independent    Goal #3   Current Status  5 ft with Mod A and RW   Goal #4 Patient will negotiate 7 stairs/one curb w/ assistive device and Min A   Goal #4   Current Status  NT   Goal #5 Patient fe RW. Reviewed WILLIAM precautions, pt demonstrates understanding but needing assist to recall 2/3 precautions when reviewed at end of evaluation. Activity tolerance limited by c/o nausea, headache and low BP. Reviewed exercises in supine.  RN updated on mobility BCC left parietal scalp   • Heart disease    • High blood pressure    • High cholesterol    • Thyroid disease        Past Surgical History  Past Surgical History:   Procedure Laterality Date   • APPENDECTOMY     • ELBOW SURGERY     • KNEE SURGERY  6/3/14 patient currently have. ..  -   Turning over in bed (including adjusting bedclothes, sheets and blankets)?: A Lot   -   Sitting down on and standing up from a chair with arms (e.g., wheelchair, bedside commode, etc.): A Lot   -   Moving from lying on back t concerns independently   Goal #5   Current Status    Goal #6 Patient independently performs home exercise program for ROM/strengthening per the instructions provided in preparation for discharge.    Goal #6  Current Status[RS.1]         Attribution Key    R the [facility] '6 clicks' Inpatient Daily Activity Short Form. Research supports that patients with this level of impairment may benefit from LAURIE to max I with Adls and to max safety with transfers and ambulation with RW for support. Rosalina Master     DISCHARGE RECOMMENDAT Bedroom Mobility: pt tolerated standing 1 min and after rest break, ambulating 5 ft     BALANCE ASSESSMENT  Static Sitting: good   Dynamic Sitting: good   Static Standing: fair   Dynamic Standing: fair     FUNCTIONAL ADL ASSESSMENT  Grooming: min assist for Therapy: ADL/IADL Dysfunction and Discharge Planning    OCCUPATIONAL THERAPY ASSESSMENT     PPE worn during session: gloves, droplet mask. Orders received, chart review complete. RN approved patient participation. Patient is a[ST.3 73year old[ST. activities; Energy conservation/work simplification techniques;ADL training;Functional transfer training; Endurance training;Patient/Family education;Patient/Family training;Equipment eval/education; Compensatory technique education[ST.2]       OCCUPATIONAL T extremity ROM is within functional limits    STRENGTH ASSESSMENT  Upper extremity strength is within functional limits    ACTIVITIES OF DAILY LIVING ASSESSMENT  AM-PAC ‘6-Clicks’ Inpatient Daily Activity Short Form  How much help from another person does t transfer with min a   Comment:     Patient will complete self care task at sink level with cga   Comment:    Patient will independently recall posterior hip precautions  Comment:         Goals  on:   Frequency: 3x week    Alyson Zamarripa OTR/CHRISTAL  E

## (undated) NOTE — LETTER
[facility]  Administration of Anesthesia  1. Emma Pineda, or _________________________________ acting on her behalf, (Patient) (Dependent/Representative) request to receive anesthesia for my pending procedure/operation/treatment.   VERNON choi bleeding, seizure, cardiac arrest and death. 7. AWARENESS: I understand that it is possible (but unlikely) to have explicit memory of events from the operating room while under general anesthesia.   8. ELECTROCONVULSIVE THERAPY PATIENTS: This consent serve below affirms that prior to the time of the procedure, I have explained to the patient and/or his/her guardian, the risks and benefits of undergoing anesthesia, as well as any reasonable alternatives.     ___________________________________________________

## (undated) NOTE — ED AVS SNAPSHOT
Silvina Barragan   MRN: [id number]    Department:  [facility] Emergency Department   Date of Visit:  8/12/2017           Disclosure     Insurance plans vary and the physician(s) referred by the ER may not be covered by your plan.  Please contact yo CARE PHYSICIAN AT ONCE OR RETURN IMMEDIATELY TO THE EMERGENCY DEPARTMENT. If you have been prescribed any medication(s), please fill your prescription right away and begin taking the medication(s) as directed.   If you believe that any of the medications

## (undated) NOTE — LETTER
AUTHORIZATION FOR SURGICAL OPERATION OR OTHER PROCEDURE    1. I hereby authorize Dr. Natacha Morelos  and [facility] staff assigned to my case to perform the following operation and/or procedure at the [facility]:    Cortisone injection in Left knee   _______________________________________________________________________________________________      _______________________________________________________________________________________________    2. My physician has explained the nature and purpose of the operation or other procedure, possible alternative methods of treatment, the risks involved, and the possibility of complication to me. I acknowledge that no guarantee has been made as to the result that may be obtained. 3.  I recognize that, during the course of this operation, or other procedure, unforseen conditions may necessitate additional or different procedure than those listed above. I, therefore, further authorize and request that the above named physician, his/her physician assistants or designees perform such procedures as are, in his/her professional opinion, necessary and desirable. 4.  Any tissue or organs removed in the operation or other procedure may be disposed of by and at the discretion of the [facility] and [facility] AT [facility]. 5.  I understand that in the event of a medical emergency, I will be transported by local paramedics to [facility] or other hospital emergency department. 6.  I certify that I have read and fully understand the above consent to operation and/or other procedure. 7.  I acknowledge that my physician has explained sedation/analgesia administration to me including the risks and benefits. I consent to the administration of sedation/analgesia as may be necessary or desirable in the judgement of my physician.     Witness signature: ___________________________________________________ Date:  ______/______/_____ Time:  ________ A. M.  P.M. Patient Name:  ______________________________________________________  (please print)      Patient signature:  ___________________________________________________             Relationship to Patient:           []  Parent    Responsible person                          []  Spouse  In case of minor or                    [] Other  _____________   Incompetent name:  __________________________________________________                               (please print)      _____________      Responsible person  In case of minor or  Incompetent signature:  _______________________________________________    Statement of Physician  My signature below affirms that prior to the time of the procedure, I have explained to the patient and/or his/her guardian, the risks and benefits involved in the proposed treatment and any reasonable alternative to the proposed treatment. I have also explained the risks and benefits involved in the refusal of the proposed treatment and have answered the patient's questions.                         Date:  ______/______/_______  Provider                      Signature:  __________________________________________________________       Time:  ___________ A.M    P.M.

## (undated) NOTE — LETTER
[facility], [place]  Authorization for Invasive Procedures  1.  I hereby authorize Dr. Clinton Styles , my physician and whomever may be designated as the doctor's assistant, to perform the following operation and/or procedure:  *** on Paolo Fan reactions, hemolytic reactions, transmission of disease such as hepatitis, AIDS, cytomegalovirus (CMV), and flluid overload.  In the event that I wish to have autologous transfusions of my own blood, or a directed donor transfusion, I will discuss this with ________________________________________________ Date: _________Time: _________    Responsible person in case of minor or unconscious: _____________________________Relationship: ____________     Witness Signature: _________________________________________

## (undated) NOTE — LETTER
Hospital Discharge Documentation  Patient was discharged to [facility] at [phone].     From: [address] Hospitalist's Office  Phone: [phone]    Patient discharged time/date: 8/19/2021  6:50 PM  Patient discharge disposition:  SNF (72.9 kg)   SpO2 97%   BMI 25.94 kg/m²[CL. 2]       Gen:   NAD.  A and O x 3  CV:   RRR, no m/g/r  Pulm:   CTA bilat  Abd:   +bs, soft, NT, ND  LE:   No c/c/e.  Right hip wound c/d/i.   Neuro:   nonfocal      Reason for Admission:    Fall    History of Prese aspirin 325 MG Tabs      Take 1 tablet (325 mg total) by mouth 2 (two) times a day.    Quantity: 60 tablet  Refills: 0     HYDROcodone-acetaminophen 5-325 MG Tabs  Commonly known as: NORCO      Take 1-2 tablets by mouth every 4 (four) hours as needed for Pa appointment as soon as possible for a visit with Graeme Hooks MD.    Specialty: Internal Medicine  Why: follow up  Contact information:  [address] [place] [id number] [facility] Discharge Diagnoses: Hip fx[CL. 1]

## (undated) NOTE — ED AVS SNAPSHOT
Kat Almazan   MRN: [id number]    Department:  [facility] Emergency Department   Date of Visit:  4/16/2018           Disclosure     Insurance plans vary and the physician(s) referred by the ER may not be covered by your plan.  Please contact yo CARE PHYSICIAN AT ONCE OR RETURN IMMEDIATELY TO THE EMERGENCY DEPARTMENT. If you have been prescribed any medication(s), please fill your prescription right away and begin taking the medication(s) as directed.   If you believe that any of the medications

## (undated) NOTE — LETTER
[address]  [facility], [place], [place]  [address]  INFORMED CONSENT FOR TRANSFUSION OF BLOOD OR BLOOD PRODUCTS  My physician has informed me of the nature, purpose, benefits and risks of transfusion for blood and blood components that ______________________________________________  (Signature of Patient)                                                            (Responsible party in case of Minor,

## (undated) NOTE — LETTER
[address], [place]  Authorization for Invasive Procedures  1.  I hereby authorize Dr. Sterling Francis , my physician and whomever may be designated as the doctor's assistant, to perform the following operation and/or procedure:  Right Hi potential risks that can occur: fever and allergic reactions, hemolytic reactions, transmission of disease such as hepatitis, AIDS, cytomegalovirus (CMV), and flluid overload.  In the event that I wish to have autologous transfusions of my own blood, or a d judgment of my physician.      Signature of Patient:  ________________________________________________ Date: _________Time: _________    Responsible person in case of minor or unconscious: _____________________________Relationship: ____________     Witness

## (undated) NOTE — IP AVS SNAPSHOT
[facility]            (For Outpatient Use Only) Initial Admit Date: 8/15/2021   Inpt/Obs Admit Date: Inpt: 8/15/21 / Obs: N/A   Discharge Date:    Cindy Stratton:  [de-identified]   MRN: [de-identified]   CSN: [id number]   CEID: [id number]        CCE Subscriber: SELF   TERTIARY INSURANCE   Payor:  Plan:    Group Number:  Insurance Type:    Subscriber Name:  Subscriber :    Subscriber ID:  Pt Rel to Subscriber:    Hospital Account Financial Class: Medicare    2021